# Patient Record
Sex: MALE | Race: WHITE
[De-identification: names, ages, dates, MRNs, and addresses within clinical notes are randomized per-mention and may not be internally consistent; named-entity substitution may affect disease eponyms.]

---

## 2018-11-01 NOTE — XMS REPORT
Patient Summary Document

                             Created on: 2018



DREW NAIR

External Reference #: [id number]

: 1945

Sex: Male



Demographics







                          Address                   [address] [place] [address]

 

                          Home Phone                [phone]

 

                          Preferred Language        Unknown

 

                          Marital Status            Unknown

 

                          [employer] Affiliation     Unknown

 

                          Race                      Unknown

 

                                        Additional Race(s)  

 

                          Ethnic Group              Unknown





Author







                          Author                    [facility]

 

                          Address                   Unknown

 

                          Phone                     Unavailable







Care Team Providers







                    Care Team Member Name    Role                Phone

 

                    DALLIN HARRIS    Unavailable         Unavailable







Problems

This patient has no known problems.



Allergies, Adverse Reactions, Alerts

This patient has no known allergies or adverse reactions.



Medications

This patient has no known medications.



Results







           Test Description    Test Time    Test Comments    Text Results    Atomic Results    Result

 Comments

 

                CHEST SINGLE (PORTABLE)    2018 10:45:00                                                   

                                                       [facility]                        [address] [address]      Patient Name: DREW NAIR                        
          MR #: [id number]                     : 1945                  
                Age/Sex: 73/M  Acct #: [id number]                             
Req #: [id number]  Adm Physician:                                               
      Ordered by: MICAH HARRIS MD                            Report #: 
[id number]        Location: ER                                      Room/Bed:    
                
___________________________________________________________________________________________________
   Procedure: [id number] DX/CHEST SINGLE (PORTABLE)  Exam Date: 18         
                  Exam Time: 1015                                              
REPORT STATUS: Signed    EXAMINATION:  CHEST SINGLE (PORTABLE)          I
NDICATION:      Chest pain. Shortness of breath        COMPARISON:  None        
  FINDINGS:   TUBES and LINES:  None.      LUNGS:  Lungs are well inflated.  
Lungs are clear.   There is no evidence of   pneumonia or pulmonary edema.      
PLEURA:  No pleural effusion or pneumothorax.      HEART AND MEDIASTINUM:  The 
cardiomediastinal silhouette is unremarkable.          BONES AND SOFT TISSUES:  
No acute osseous lesion.  Soft tissues are   unremarkable.      UPPER ABDOMEN: 
No free air under the diaphragm.          IMPRESSION:    No acute thoracic 
abnormality.         Signed by: Dr. Glen Ward M.D. on 2018 10:45 AM     
  Dictated By: GLEN WARD MD, MD  Electronically Signed By: GLEN WARD MD, MD on 18 1045  Transcribed By: BETH on 18 1045       COPY TO:   
MICAH HARRIS MD

## 2018-11-01 NOTE — DIAGNOSTIC IMAGING REPORT
EXAMINATION:  CHEST SINGLE (PORTABLE)    



INDICATION:      Chest pain. Shortness of breath  



COMPARISON:  None

     

FINDINGS:

TUBES and LINES:  None.



LUNGS:  Lungs are well inflated.  Lungs are clear.   There is no evidence of

pneumonia or pulmonary edema.



PLEURA:  No pleural effusion or pneumothorax.



HEART AND MEDIASTINUM:  The cardiomediastinal silhouette is unremarkable.    



BONES AND SOFT TISSUES:  No acute osseous lesion.  Soft tissues are

unremarkable.



UPPER ABDOMEN: No free air under the diaphragm.    



IMPRESSION: 

No acute thoracic abnormality.





Signed by: Dr. Glen Ward M.D. on 11/1/2018 10:45 AM

## 2018-11-01 NOTE — CONSULTATION
DATE OF CONSULTATION:  November 01, 2018 



CARDIOLOGY CONSULTATION 



REQUESTING PHYSICIAN:  Dr. Delarosa



REASON FOR CONSULT:  Chest pain, shortness of breath. 



HISTORY OF PRESENT ILLNESS:  Mr. Maguire is a 72-year-old gentleman with 

past medical history as listed below.  Apparently, he has been experiencing 

chest pain off and on for the last 6 months or so.  He has also been 

getting short of breath for the last several weeks and had some leg edema, 

and he decided to come to the hospital.  He stated the chest pain was mild 

all across his chest.  He thought it was reflux.  However, since it 

continued to persist, he called the office, and he was advised to get to 

the ER.



REVIEW OF SYMPTOMS 

CONSTITUTIONAL:  Had some fatigue and weakness.  

HEENT:  No headache, blurring of vision, seizure or syncope.

CARDIOVASCULAR:  Chest pain.  Has some dyspnea.  Has leg edema.  No 

orthopnea or PND.

RESPIRATORY:  No cough, fever, or expectoration.

GI:  No abdominal pain, vomiting or diarrhea.

:  No dysuria, frequency, incontinence.



ALLERGIES:  AZITHROMYCIN. 



MEDICATIONS:  See list.



PAST MEDICAL HISTORY

1. History of hypertension.

2. History of chronic backache.

3. History of cellulitis.



PAST SURGICAL HISTORY:  Tonsillectomy.



SOCIAL HISTORY:  He does not smoke or drink.



FAMILY HISTORY:  Noncontributory.



PHYSICAL EXAMINATION 

GENERAL:  Obese gentleman who is awake, alert, not in any obvious distress. 

 

VITALS:  Heart rate 69, blood pressure 142/78, respiratory rate 19, 

temperature 97.6.

HEENT:  Atraumatic. 

NECK:  No JVD, bruit, thyromegaly, or lymphadenopathy.

CARDIOVASCULAR:  The 1st and 2nd heart sounds are heard.  No murmurs, rubs 

or gallops appreciated.

CHEST:  Decreased air entry at the bases.  No adventitious sounds 

appreciated.

ABDOMEN:  Obese, nontender.

EXTREMITIES:  1+ edema.



LABS:  WBC 12.4, hemoglobin 14.3, hematocrit 42.8, platelets 287.  Sodium 

140, potassium 4.0, chloride 108, BUN 17, creatinine 0.5.  Troponin 0.03.  

.  AST 18, ALT 34, alk phos 85.  



EKG shows sinus rhythm at 67 beats per minute, normal axis, normal 

intervals, LVH, ST depression V5 and V6.



Chest x-ray:  No acute thoracic abnormality.



IMPRESSION 

1. Congestive heart failure.

2. Chest pain.

3. Hypertension.

4. Obesity. 

5. Chronic backache.



PLAN

1. IV diuresis.

2. Follow serial cardiac enzymes.

3. Get echocardiogram to assess LV function and valvular function. 

4. Treat him with aspirin, beta blocker, and ACE inhibitor.

5. Further cardiac workup depending on clinical course.

6. Counseled on diet, fluid restriction and low salt.

7. I discussed my impression and plan of management with the patient, 

and he understands.



As always, I appreciate and thank you very much for your referrals.



 



DD:  11/01/2018 12:33

DT:  11/01/2018 13:06

Job#:  [id number]

## 2018-11-02 NOTE — HISTORY AND PHYSICAL
PRIMARY CARE PROVIDER:  Dr. Pro Manuel.



CONSULTANT:  Nelson Barton MD



CHIEF COMPLAINT:  Chest pain and shortness of breath.



HISTORY:  This 73-year-old male came to the hospital with chest pain and 

shortness of breath.  Patient was seen by Dr. Barton.  Patient is doing 

much better now.  He is stable.  Cardiac enzymes have been negative.  

Echocardiogram showed that the patient's ejection fraction is approximately 

55% to 60% with trace AI and MR.  The patient has some fluid overload.  He 

does have chronic venous skin changes of the lower extremities.  Patient is 

obese.



PAST MEDICAL HISTORY:  Hypertension.  Chronic lower back pain.



PAST SURGICAL HISTORY:  Tonsillectomy.



HOME MEDICATIONS:  He is taking Norvasc, hydralazine and metoprolol.



ALLERGIES:  AZITHROMYCIN.



SOCIAL HISTORY:  Patient does not smoke or use alcohol.  No recreational 

drugs.



REVIEW OF SYSTEMS:  Atypical chest pain, nonradiating.  No nausea or 

vomiting.  Some shortness breath especially with exertion. 



PHYSICAL EXAMINATION:  

GENERAL:  The patient is not in acute distress.  He is awake.

VITAL SIGNS:  Temperature is 98, blood pressure 183/78.  Pulse rate 77.  

Respirations 18. 

HEENT:  Normocephalic and atraumatic.  Sclerae are anicteric. 

NECK:  Supple grossly.  No JVD.

PULMONARY:  Diminished breath sounds at bases with no wheezing or rales.  

CARDIOVASCULAR:  S1 and S2.  Regular rate and rhythm. 

ABDOMEN:  Soft, obese.  Nontender.  Nondistended. 

EXTREMITIES:  1+ edema with chronic venous skin changes.

NEUROLOGIC:  No gross focal deficit. 



LABORATORY:  Sodium is 140, potassium 3.7, chloride 106, bicarb 24, BUN 13, 

creatinine 0.8.  Glucose 125.



WBC 11.5, hemoglobin 13.8.  Hematocrit 41.5.  Platelets 260.



Echocardiogram:  Ejection fraction of 55% to 60%.



Chest x-ray is unremarkable.



IMPRESSION  

1. Atypical chest pain.

2. Fluid overload, possible secondary to obesity versus diastolic 

dysfunction heart failure.  



PLAN:  Continue with diuresis.  Resume home medication.  Patient was seen 

by Dr. Barton.  The patient would like to go home and follow up next week 

for cardiac stress test.  He does not want to wait until Monday to receive 

his stress test.  The patient is otherwise stable.  He is stable to go home 

and follow up with Dr. Barton.



    



DD:  11/02/2018 09:23

DT:  11/02/2018 09:46

Job#:  [id number]

## 2018-11-02 NOTE — DISCHARGE SUMMARY
PRIMARY CARE PHYSICIAN:  Dr. Pro Manuel.



CONSULTANT:  Dr. Nelson Barton. 



FINAL DIAGNOSES

1. Fluid overload, possible acute diastolic dysfunction heart failure 

with ejection fraction of 55% to 60%.

2. Lower extremity edema, improving with intravenous Lasix.

3. Obesity.

4. Chest pain, atypical.



PLAN:  Patient will follow up with Dr. Barton next week for stress test.  

The patient is otherwise stable.  Please review my history and physical.  

Patient is to follow up.  He will resume his home medication.  I will add 

on Lasix 40 mg once a day along with potassium 20 mEq daily.  The patient 

is to follow up and have his blood rechecked for renal function with his 

primary care physician, Dr. Pro Manuel.

 





DD:  11/02/2018 09:24

DT:  11/02/2018 15:24

Job#:  [id number]

## 2019-01-03 NOTE — CARDIOLOGY REPORT
DATE OF STUDY:  January 02, 2019 



LEXISCAN NUCLEAR STRESS TEST



INDICATIONS:  Chest pain.



DESCRIPTION OF PROCEDURE:  After informed consent, patient was brought to 

the stress lab.  He was given 10.7 mCi of technetium 99 Myoview, and 

myocardial perfusion SPECT images were obtained in the horizontal long 

axis, short axis and vertical long axis views.  Subsequently patient was 

given 0.4 mg Lexiscan over 10 seconds.  Patient was given 33 mCi of 

technetium 99 Myoview, and myocardial perfusion SPECT images were obtained 

in the horizontal long axis, short axis and vertical long axis views.  

Gaiting images were also obtained.  Patient tolerated this procedure 

without any complications.  



REPORT:  Baseline EKG shows sinus rhythm at 75 beats per minute.  Normal 

axis.  Normal intervals.  ST depression in the inferolateral leads.



PARAMETERS

1. Resting heart rate is 75 beats per minute.

2. Maximal heart rate is 90 beats per minute.

3. Resting blood pressure is 149/67 mmHg.

4. Maximum blood pressure 156/71 mmHg.



REASON FOR TERMINATION:  Endpoint attained.



INTERPRETATION

1. Negative for chest pain.

2. Negative for arrhythmias.  

3. Blood pressure response consistent with Lexiscan.

4. No significant ST-T changes seen during Lexiscan infusion compared to 

baseline.

5. Analysis of SPECT images reveals a small to moderate area of 

decreased radioisotope uptake in the anterior apical wall during stress, 

which partially reverses during rest.  There is a moderate area of 

decreased radioisotope uptake in the inferior wall both during stress 

and rest without any significant reversible perfusion defects.



CONCLUSIONS

1. This study demonstrates a small to moderate area of anterior apical 

ischemia with a small area of apical infarct.  

2. There is a moderate area of inferior wall infarct.  

3. The inferior apical wall is hypokinetic.  

4. The overall ejection fraction is 35%. 











DD:  01/03/2019 12:32

DT:  01/03/2019 13:42

Job#:  [id number]

## 2019-02-05 LAB
ALBUMIN SERPL-MCNC: 3.5 G/DL (ref 3.5–5)
ALBUMIN/GLOB SERPL: 1.2 {RATIO} (ref 0.8–2)
ALP SERPL-CCNC: 85 IU/L (ref 40–150)
ALT SERPL-CCNC: 28 IU/L (ref 0–55)
ANION GAP SERPL CALC-SCNC: 15.5 MMOL/L (ref 8–16)
BASOPHILS # BLD AUTO: 0.1 10*3/UL (ref 0–0.1)
BASOPHILS NFR BLD AUTO: 0.7 % (ref 0–1)
BUN SERPL-MCNC: 24 MG/DL (ref 7–26)
BUN/CREAT SERPL: 23 (ref 6–25)
CALCIUM SERPL-MCNC: 9.2 MG/DL (ref 8.4–10.2)
CHLORIDE SERPL-SCNC: 100 MMOL/L (ref 98–107)
CO2 SERPL-SCNC: 27 MMOL/L (ref 22–29)
DEPRECATED APTT PLAS QN: 33.1 SECONDS (ref 23.8–35.5)
DEPRECATED INR PLAS: 0.98
DEPRECATED NEUTROPHILS # BLD AUTO: 8.5 10*3/UL (ref 2.1–6.9)
EGFRCR SERPLBLD CKD-EPI 2021: > 60 ML/MIN (ref 60–?)
EOSINOPHIL # BLD AUTO: 0.4 10*3/UL (ref 0–0.4)
EOSINOPHIL NFR BLD AUTO: 3.9 % (ref 0–6)
ERYTHROCYTE [DISTWIDTH] IN CORD BLOOD: 14.8 % (ref 11.7–14.4)
GLOBULIN PLAS-MCNC: 2.9 G/DL (ref 2.3–3.5)
GLUCOSE SERPLBLD-MCNC: 106 MG/DL (ref 74–118)
HCT VFR BLD AUTO: 39.8 % (ref 38.2–49.6)
HGB BLD-MCNC: 12.5 G/DL (ref 14–18)
LYMPHOCYTES # BLD: 1.6 10*3/UL (ref 1–3.2)
LYMPHOCYTES NFR BLD AUTO: 13.9 % (ref 18–39.1)
MCH RBC QN AUTO: 26.9 PG (ref 28–32)
MCHC RBC AUTO-ENTMCNC: 31.4 G/DL (ref 31–35)
MCV RBC AUTO: 85.6 FL (ref 81–99)
MONOCYTES # BLD AUTO: 0.8 10*3/UL (ref 0.2–0.8)
MONOCYTES NFR BLD AUTO: 6.7 % (ref 4.4–11.3)
NEUTS SEG NFR BLD AUTO: 74.4 % (ref 38.7–80)
PLATELET # BLD AUTO: 273 X10E3/UL (ref 140–360)
POTASSIUM SERPL-SCNC: 4.5 MMOL/L (ref 3.5–5.1)
PROTHROMBIN TIME: 13.9 SECONDS (ref 11.9–14.5)
RBC # BLD AUTO: 4.65 X10E6/UL (ref 4.3–5.7)
SODIUM SERPL-SCNC: 138 MMOL/L (ref 136–145)

## 2019-02-05 NOTE — DIAGNOSTIC IMAGING REPORT
EXAM: CHEST 2 VIEWS, PA and lateral

DATE: 2/5/2019Time stamp on exam: 10:27 AM

INDICATION: Preoperative for cardiac catheterization

COMPARISON: None



FINDINGS:

LINES/TUBES: None



LUNGS: No consolidations or edema. 



PLEURA: No effusions or pneumothorax.



HEART AND MEDIASTINUM: Tortuous and calcified thoracic aorta. The heart is not

enlarged. No mediastinal adenopathy.



BONES AND SOFT TISSUES: No acute findings. Degenerative changes of the spine.



IMPRESSION:

No acute thoracic abnormality.











Signed by: Dr. Messi Davis DO on 2/5/2019 11:07 AM

## 2019-02-06 ENCOUNTER — HOSPITAL ENCOUNTER (OUTPATIENT)
Dept: HOSPITAL 88 - CATH LAB | Age: 74
Setting detail: OBSERVATION
LOS: 1 days | Discharge: HOME | End: 2019-02-07
Payer: MEDICARE

## 2019-02-06 VITALS — DIASTOLIC BLOOD PRESSURE: 66 MMHG | SYSTOLIC BLOOD PRESSURE: 110 MMHG

## 2019-02-06 VITALS — DIASTOLIC BLOOD PRESSURE: 72 MMHG | SYSTOLIC BLOOD PRESSURE: 142 MMHG

## 2019-02-06 VITALS — DIASTOLIC BLOOD PRESSURE: 73 MMHG | SYSTOLIC BLOOD PRESSURE: 141 MMHG

## 2019-02-06 VITALS — SYSTOLIC BLOOD PRESSURE: 120 MMHG | DIASTOLIC BLOOD PRESSURE: 74 MMHG

## 2019-02-06 VITALS — SYSTOLIC BLOOD PRESSURE: 116 MMHG | DIASTOLIC BLOOD PRESSURE: 98 MMHG

## 2019-02-06 VITALS — DIASTOLIC BLOOD PRESSURE: 75 MMHG | SYSTOLIC BLOOD PRESSURE: 141 MMHG

## 2019-02-06 VITALS — DIASTOLIC BLOOD PRESSURE: 65 MMHG | SYSTOLIC BLOOD PRESSURE: 130 MMHG

## 2019-02-06 VITALS — SYSTOLIC BLOOD PRESSURE: 126 MMHG | DIASTOLIC BLOOD PRESSURE: 75 MMHG

## 2019-02-06 VITALS — SYSTOLIC BLOOD PRESSURE: 138 MMHG | DIASTOLIC BLOOD PRESSURE: 123 MMHG

## 2019-02-06 VITALS — DIASTOLIC BLOOD PRESSURE: 56 MMHG | SYSTOLIC BLOOD PRESSURE: 112 MMHG

## 2019-02-06 VITALS — SYSTOLIC BLOOD PRESSURE: 145 MMHG | DIASTOLIC BLOOD PRESSURE: 77 MMHG

## 2019-02-06 VITALS — DIASTOLIC BLOOD PRESSURE: 102 MMHG | SYSTOLIC BLOOD PRESSURE: 134 MMHG

## 2019-02-06 VITALS — DIASTOLIC BLOOD PRESSURE: 79 MMHG | SYSTOLIC BLOOD PRESSURE: 145 MMHG

## 2019-02-06 VITALS — DIASTOLIC BLOOD PRESSURE: 91 MMHG | SYSTOLIC BLOOD PRESSURE: 132 MMHG

## 2019-02-06 VITALS — SYSTOLIC BLOOD PRESSURE: 98 MMHG | DIASTOLIC BLOOD PRESSURE: 66 MMHG

## 2019-02-06 VITALS — SYSTOLIC BLOOD PRESSURE: 136 MMHG | DIASTOLIC BLOOD PRESSURE: 65 MMHG

## 2019-02-06 VITALS — SYSTOLIC BLOOD PRESSURE: 104 MMHG | DIASTOLIC BLOOD PRESSURE: 80 MMHG

## 2019-02-06 VITALS — SYSTOLIC BLOOD PRESSURE: 134 MMHG | DIASTOLIC BLOOD PRESSURE: 102 MMHG

## 2019-02-06 VITALS — DIASTOLIC BLOOD PRESSURE: 76 MMHG | SYSTOLIC BLOOD PRESSURE: 144 MMHG

## 2019-02-06 VITALS — SYSTOLIC BLOOD PRESSURE: 109 MMHG | DIASTOLIC BLOOD PRESSURE: 65 MMHG

## 2019-02-06 VITALS — DIASTOLIC BLOOD PRESSURE: 67 MMHG | SYSTOLIC BLOOD PRESSURE: 116 MMHG

## 2019-02-06 VITALS — WEIGHT: 315 LBS | BODY MASS INDEX: 41.75 KG/M2 | HEIGHT: 73 IN

## 2019-02-06 VITALS — DIASTOLIC BLOOD PRESSURE: 68 MMHG | SYSTOLIC BLOOD PRESSURE: 145 MMHG

## 2019-02-06 VITALS — SYSTOLIC BLOOD PRESSURE: 148 MMHG | DIASTOLIC BLOOD PRESSURE: 87 MMHG

## 2019-02-06 VITALS — SYSTOLIC BLOOD PRESSURE: 141 MMHG | DIASTOLIC BLOOD PRESSURE: 76 MMHG

## 2019-02-06 VITALS — DIASTOLIC BLOOD PRESSURE: 119 MMHG | SYSTOLIC BLOOD PRESSURE: 142 MMHG

## 2019-02-06 VITALS — SYSTOLIC BLOOD PRESSURE: 130 MMHG | DIASTOLIC BLOOD PRESSURE: 102 MMHG

## 2019-02-06 DIAGNOSIS — R94.39: ICD-10-CM

## 2019-02-06 DIAGNOSIS — I11.0: ICD-10-CM

## 2019-02-06 DIAGNOSIS — I35.1: ICD-10-CM

## 2019-02-06 DIAGNOSIS — K21.9: ICD-10-CM

## 2019-02-06 DIAGNOSIS — R07.2: ICD-10-CM

## 2019-02-06 DIAGNOSIS — Z88.1: ICD-10-CM

## 2019-02-06 DIAGNOSIS — E66.9: ICD-10-CM

## 2019-02-06 DIAGNOSIS — Z01.810: ICD-10-CM

## 2019-02-06 DIAGNOSIS — I07.1: ICD-10-CM

## 2019-02-06 DIAGNOSIS — I34.0: ICD-10-CM

## 2019-02-06 DIAGNOSIS — Z01.811: ICD-10-CM

## 2019-02-06 DIAGNOSIS — G47.33: ICD-10-CM

## 2019-02-06 DIAGNOSIS — Z82.49: ICD-10-CM

## 2019-02-06 DIAGNOSIS — I50.32: ICD-10-CM

## 2019-02-06 DIAGNOSIS — I35.0: ICD-10-CM

## 2019-02-06 DIAGNOSIS — I25.10: Primary | ICD-10-CM

## 2019-02-06 DIAGNOSIS — Z01.812: ICD-10-CM

## 2019-02-06 LAB
CHOLEST SERPL-MCNC: 182 MD/DL (ref 0–199)
CHOLEST/HDLC SERPL: 3.8 {RATIO} (ref 3.9–4.7)
HDLC SERPL-MSCNC: 48 MG/DL (ref 40–60)
LDLC SERPL CALC-MCNC: 112 MG/DL (ref 60–130)
TRIGL SERPL-MCNC: 111 MG/DL (ref 0–149)

## 2019-02-06 PROCEDURE — 92928 PRQ TCAT PLMT NTRAC ST 1 LES: CPT

## 2019-02-06 PROCEDURE — 85347 COAGULATION TIME ACTIVATED: CPT

## 2019-02-06 PROCEDURE — 36415 COLL VENOUS BLD VENIPUNCTURE: CPT

## 2019-02-06 PROCEDURE — 93005 ELECTROCARDIOGRAM TRACING: CPT

## 2019-02-06 PROCEDURE — 85610 PROTHROMBIN TIME: CPT

## 2019-02-06 PROCEDURE — 71046 X-RAY EXAM CHEST 2 VIEWS: CPT

## 2019-02-06 PROCEDURE — 85730 THROMBOPLASTIN TIME PARTIAL: CPT

## 2019-02-06 PROCEDURE — 93306 TTE W/DOPPLER COMPLETE: CPT

## 2019-02-06 PROCEDURE — 93458 L HRT ARTERY/VENTRICLE ANGIO: CPT

## 2019-02-06 PROCEDURE — 80061 LIPID PANEL: CPT

## 2019-02-06 PROCEDURE — 85025 COMPLETE CBC W/AUTO DIFF WBC: CPT

## 2019-02-06 PROCEDURE — 80048 BASIC METABOLIC PNL TOTAL CA: CPT

## 2019-02-06 PROCEDURE — 80053 COMPREHEN METABOLIC PANEL: CPT

## 2019-02-06 NOTE — XMS REPORT
Clinical Summary

                             Created on: 2019



FredyfatoumataLuke

External Reference #: [id number]

: 1945

Sex: Male



Demographics







                          Address                   [address] [place]  [address]

 

                          Home Phone                [phone]

 

                          Preferred Language        English

 

                          Marital Status            Life Partner

 

                          Quaker Affiliation     [id number]

 

                          Race                      White

 

                          Ethnic Group              Non-





Author







                          Author                    [facility]

 

                          Organization              [facility]

 

                          Address                   Unknown

 

                          Phone                     Unavailable







Support







                Name            Relationship    Address         Phone

 

                Bhupendra Britton    KOMAL            Unknown         [phone]







Care Team Providers







                    Care Team Member Name    Role                Phone

 

                    Pro Manuel MD    PCP                 [phone]







Allergies







                                        Comments



                 Active Allergy     Reactions       Severity        Noted Date 

 

                                        



Pt reacted with hives all over his body after receiving z-pack for a cold



                     Azithromycin        Hives               2018 







Medications







                          End Date                  Status



              Medication     Sig          Dispensed     Refills      Start  



                                         Date  

 

                                                    Active



                 amlodipine-benazepril     Take 1          1                 



                     (LOTREL) 10-40 mg per     capsule by          8  



                           capsule                   mouth every     



                                         morning.     

 

                                                    Active



                 hydrALAZINE (APRESOLINE)     Take 1 tablet      1                 



                     25 MG tablet        by mouth 2          8  



                                         (two) times a     



                                         day.     

 

                                                    Active



                 metoprolol tartrate     Take 1 tablet      1               10/31/201  



                     (LOPRESSOR) 50 mg tablet     by mouth 2          8  



                                         (two) times a     



                                         day.     

 

                                                    Active



                 omeprazole (PriLOSEC) 20     Take 1          0                 



                     MG capsule          capsule by          8  



                                         mouth every     



                                         morning.     

 

                                                    Active



                 furosemide (LASIX) 40 mg     Take 1 tablet      1               10/31/201  



                     tablet              by mouth            8  



                                         every     



                                         morning.     

 

                                                    Active



                 potassium chloride     Take 1 tablet      0                 



                     (K-DUR) 20 MEQ CR tablet     by mouth            8  



                                         every     



                                         morning.     

 

                                                    Active



                 tiZANidine (ZANAFLEX) 4     Take 1 tablet      0                 



                     MG tablet           by mouth            8  



                                         nightly as     



                                         needed.     

 

                                                    Active



                     aspirin (ECOTRIN) 325 MG     Take 325 mg         0   



                           enteric coated tablet     by mouth     



                                         every     



                                         morning.     

 

                          2019                



              levoFLOXacin (LEVAQUIN)     Take 1 tablet     5 tablet     0              



                     250 MG tablet       (250 mg             8  



                                         total) by     



                                         mouth daily     



                                         for 5 days.     







Active Problems







 



                           Problem                   Noted Date

 

 



                           Chest pain                2018

 

 



                           SOB (shortness of breath)     2018







Encounters







                          Care Team                 Description



                     Date                Type                Specialty  

 

                                        



Deshawn Álvarez DO                     SOB (shortness of breath) (Primary Dx)



                     2018          [facility]            General Internal Medicine  



                           -                         Encounter   



                                         2018    



after 2018



Family History







   



                 Medical History     Relation        Name            Comments

 

   



                     Cancer              Father              Lung cancer 









   



                 Relation        Name            Status          Comments

 

   



                     Father              Lung cancer          







Social History







                                        Date



                 Tobacco Use     Types           Packs/Day       Years Used 

 

                                         



                                         Never Smoker    

 

    



                                         Smokeless Tobacco: Never   



                                         Used   









   



                 Alcohol Use     Drinks/Week     oz/Week         Comments

 

   



                                         No   









  



                     Alcohol Habits      Answer              Date Recorded

 

  



                     How often do you have a drink containing alcohol?     Never               2018

 

  



                           How many drinks containing alcohol do you have on     Not asked 



                                         a typical day when you are drinking?  

 

  



                           How often do you have six or more drinks on one     Not asked 



                                         occasion?  









 



                           Sex Assigned at Birth     Date Recorded

 

 



                                         Not on file 









                                        Industry



                           Job Start Date            Occupation 

 

                                        Not on file



                           Not on file               Not on file 









                                        Travel End



                           Travel History            Travel Start 

 





                                         No recent travel history available.







Last Filed Vital Signs







                                        Time Taken



                           Vital Sign                Reading 

 

                                        2018 10:28 AM CST



                           Blood Pressure            162/72 

 

                                        2018 10:28 AM CST



                           Pulse                     71 

 

                                        2018 10:28 AM CST



                           Temperature               36.6 C (97.9 F) 

 

                                        2018 10:28 AM CST



                           Respiratory Rate          18 

 

                                        2018 10:28 AM CST



                           Oxygen Saturation         96% 

 

                                        -



                           Inhaled Oxygen            - 



                                         Concentration  

 

                                        -



                           Weight                    - 

 

                                        2018  5:00 PM CST



                           Height                    185.4 cm (6' 1") 

 

                                        -



                           Body Mass Index           - 







Plan of Treatment







   



                 Health Maintenance     Due Date        Last Done       Comments

 

   



                           COLON CANCER SCREENING     1995  

 

   



                           SHINGLES VACCINES (1 of     1995  



                                         2)   

 

   



                           PNEUMOCOCCAL              2010  



                                         POLYSACCHARIDE VACCINE   



                                         AGE 65 AND OVER   

 

   



                           PNEUMOCOCCAL-13           2010  

 

   



                           INFLUENZA VACCINE         2018  







Procedures







                                        Comments



                 Procedure Name     Priority        Date/Time       Associated Diagnosis 

 

                                        



Results for this procedure are in the results section.



                     ESTIMATED GFR       Routine             2018  



                                         4:22 AM CST  

 

                                        



Results for this procedure are in the results section.



                     BASIC METABOLIC PANEL     Routine             2018  



                                         4:22 AM CST  

 

                                        



Results for this procedure are in the results section.



                     HC COMPLETE BLD COUNT     Routine             2018  



                           W/AUTO DIFF               4:22 AM CST  

 

                                        



Results for this procedure are in the results section.



                     TROPONIN            Timed               2018  



                                         12:28 AM CST  

 

                                        



Results for this procedure are in the results section.



                     TROPONIN            Timed               2018  



                                         5:52 PM CST  

 

                                        



Results for this procedure are in the results section.



                     CT ANGIOGRAM PE CHEST     Routine             2018  



                                         5:30 PM CST  



after 2018



Results

* Estimated GFR (2018  4:22 AM CST)





   



                 Component       Value           Ref Range       Performed At

 

   



                 Estimated GFR     66              mL/min/1.73 m2     [facility]



                           Comment:                  [facility]



                                         CatergoryUnitsInte  



                                         rpretation  



                                         G1  



                                         >=90 Normal or high  



                                         G2  



                                         60-89Mildly decreased  



                                         H1i99-07  



                                         Mildly to moderately  



                                         decreased  



                                         H8j31-24  



                                         Moderately to severely  



                                         decreased  



                                         G4  



                                         15-29Severely  



                                         decreased  



                                         G5  



                                         <15Kidney failure  



                                         The eGFR was calculated using  



                                         the Chronic Kidney Disease  



                                         Epidemiology Collaboration  



                                         (CKD-EPI) equation.  



                                         Interpretation is based on  



                                         recommendations of the  



                                         National Kidney  



                                         Foundation-Kidney Disease  



                                         Outcomes Quality  



                                         Initiative (NKF-KDOQI)  



                                         published in 2014.  













                                         Specimen

 





                                         Plasma specimen









   



                 Performing Organization     Address         City/State/Zipcode     Phone Number

 

   



                     [facility]     [address] [place] [address] 



                                         PATHOLOGY AND GENOMIC   



                                         MEDICINE   

 

   



                     [facility]     [address]      [place], [place] [address] [place] [address]   





* CBC with platelet and differential (2018  4:22 AM CST)





   



                 Component       Value           Ref Range       Performed At

 

   



                 WBC             10.78           4.50 - 11.00 k/uL     [facility]

 

   



                 RBC             4.79            4.40 - 6.00 m/uL     [facility]

 

   



                 HGB             13.4 (L)        14.0 - 18.0 g/dL     [facility]

 

   



                 HCT             42.0            41.0 - 51.0 %     [facility]

 

   



                 MCV             87.7            82.0 - 100.0 fL     [facility]

 

   



                 MCH             28.0            27.0 - 34.0 pg     [facility]

 

   



                 MCHC            31.9            31.0 - 37.0 g/dL     [facility]

 

   



                 RDW - SD        47.2            37.0 - 55.0 fL     [facility]

 

   



                 MPV             9.2             8.8 - 13.2 fL     [facility]

 

   



                 Platelet count     300             150 - 400 k/uL     [facility]

 

   



                 Nucleated RBC     0.00            /100 WBC        [facility]

 

   



                 Neutrophils     75.0 (H)        39.0 - 69.0 %     [facility]

 

   



                 Lymphocytes     15.0 (L)        25.0 - 45.0 %     [facility]

 

   



                 Monocytes       6.8             0.0 - 10.0 %     [facility]

 

   



                 Eosinophils     2.3             0.0 - 5.0 %     [facility]

 

   



                 Basophils       0.6             0.0 - 1.0 %     [facility]













                                         Specimen

 





                                         Blood









   



                 Performing Organization     Address         [facility]     Phone Number

 

   



                     [address] [place] [address] 



                                         PATHOLOGY AND GENOMIC   



                                         MEDICINE   

 

   



                     [address]      [address]   





* Basic metabolic panel (2018  4:22 AM CST)





   



                 Component       Value           Ref Range       Performed At

 

   



                 Sodium          142             135 - 148 mEq/L     [facility]

 

   



                 Potassium       3.8             3.5 - 5.0 mEq/L     [facility]

 

   



                 Chloride        103             98 - 112 mEq/L     [facility]

 

   



                 CO2             28              24 - 31 mEq/L     [facility]

 

   



                 Anion gap       11@ANIO         7 - 15 mEq/L     [facility]

 

   



                 BUN             20              8 - 23 mg/dL     [facility]

 

   



                 Creatinine      1.10            0.70 - 1.20 mg/dL     [facility]

 

   



                 Glucose         126 (H)         65 - 99 mg/dL     [facility]

 

   



                 Calcium         9.2             8.8 - 10.2 mg/dL     [facility]













                                         Specimen

 





                                         Plasma specimen









   



                 Performing Organization     Address         [place]     Phone Number

 

   



                     [address] [place] [address] 



                                         PATHOLOGY AND GENOMIC   



                                         MEDICINE   

 

   



                     [address]      [address]   





* Troponin (2018 12:28 AM CST)



Only the most recent of 2 results within the time period is included.





   



                 Component       Value           Ref Range       Performed At

 

   



                 Troponin        <0.300          0.000 - 0.300 ng/mL     [facility]



                           Comment:                  [facility]



                                         0.30 - 1.49  



                                         ng/mlMay  



                                         indicate increased risk of  



                                         acute  



                                           



                                          coronary  



                                         syndrome.  



                                           



                                           



                                         >=1.5  



                                         ng/ml  



                                         Consistent with acute  



                                         myocardial  



                                           



                                          infarction.  



                                           



                                           



                                           



                                           



                                           



                                         The diagnostic value of a  



                                         single normal or  



                                         non-diagnostic  



                                         result is  



                                         questionable.Serial  



                                         samples at 2-6 hour intervals  



                                         are required to rule out acute  



                                         myocardial injury.  













                                         Specimen

 





                                         Plasma specimen









   



                 Performing Organization     Address         [facility]     Phone Number

 

   



                     [address] [place] [address] 



                                         PATHOLOGY AND GENOMIC   



                                         MEDICINE   

 

   



                     [facility]     [address] [place] [address] 



                                         [facility]   





* CT Angiogram Pe Chest (2018  5:30 PM CST)





 



                           Narrative                 Performed At

 

 



                           EXAMINATION:               [facility]



                                         CT ANGIOGRAM PE CHEST 



                                         CLINICAL HISTORY:73 years Male PE r o 



                                         TECHNIQUE:CT angiographic images of the chest were obtained during 



                                         intravenous administration of iodinated contrast. Computerized reformatted 



                                         images and 3-D MIP images were also obtained and archived (CT pulmonary embolus

 



                                         protocol). CT imaging was 



                                         performed with iterative reconstruction techniques and/or automated exposure 



                                         control to reduce radiation dose. 



                                         COMPARISON: 



                                         None. 



                                         FINDINGS: 



                                         CHEST: 



                                         Pulmonary arteries: Normal in size. No intraluminal filling defects. 



                                         Lungs and airways: Minimal groundglass opacities are present in the lung bases 





                                         likely related to hypoventilation. There is minimal bibasilar atelectasis. There

 



                                         are no focal consolidations or suspicious pulmonary nodules. 



                                         Pleura: Tiny dependent bilateral pleural effusions are present. No pneumothorax.

 



                                         Mediastinum and lymph nodes: No hilar or mediastinal mass or adenopathy.There 



                                         are small paratracheal, prevascular, and subcarinal lymph nodes. 



                                         Cardiovascular: The heart size is normal. Minimal coronary calcifications are 



                                         present. There are mild aortic and mitral valvular calcifications. There is no 





                                         pericardial effusion. 



                                         Upper abdomen: There are small bilateral adrenal adenomas. The upper abdomen is

 



                                         otherwise unremarkable. 



                                         Bones: The thoracic aorta is minimally atherosclerotic without evidence of 



                                         aneurysm or dissection. Moderate hypertrophic changes are present in the 



                                         thoracic spine. There are no suspicious bony abnormalities. 



                                         Other: There is moderate gynecomastia, greater on the left. 



                                         IMPRESSION: 



                                         1.No CT evidence of acute pulmonary thromboembolic disease. 



                                         2.Minimal bibasilar atelectasis with tiny bilateral dependent pleural 



                                         effusions. 



                                         3.Minimal CAD. Mild aortic and mitral valvular disease. 



                                         4.Small benign bilateral adrenal adenomas. 



                                         5.Gynecomastia, greater on the left. 



                                         [id number] 









                                        Procedure Note

 

                                        



 Interface, Radiology Results Incoming - 2018  5:42 PM CST



EXAMINATION:

CT ANGIOGRAM PE CHEST



CLINICAL HISTORY:73 years Male PE r o



TECHNIQUE:  CT angiographic images of the chest were obtained during intravenous
administration of iodinated contrast. Computerized reformatted images and 3-D 
MIP images were also obtained and archived (CT pulmonary embolus protocol). CT 
imaging was 

performed with iterative reconstruction techniques and/or automated exposure 
control to reduce radiation dose. 



COMPARISON:

None.



FINDINGS:



CHEST:



Pulmonary arteries: Normal in size. No intraluminal filling defects.



Lungs and airways: Minimal groundglass opacities are present in the lung bases 
likely related to hypoventilation. There is minimal bibasilar atelectasis. There
are no focal consolidations or suspicious pulmonary nodules. 



Pleura: Tiny dependent bilateral pleural effusions are present. No pneumothorax.



Mediastinum and lymph nodes: No hilar or mediastinal mass or adenopathy.There 
are small paratracheal, prevascular, and subcarinal lymph nodes.   



Cardiovascular: The heart size is normal. Minimal coronary calcifications are 
present. There are mild aortic and mitral valvular calcifications. There is no 
pericardial effusion.



Upper abdomen: There are small bilateral adrenal adenomas. The upper abdomen is 
otherwise unremarkable.



Bones: The thoracic aorta is minimally atherosclerotic without evidence of 
aneurysm or dissection. Moderate hypertrophic changes are present in the 
thoracic spine. There are no suspicious bony abnormalities. 



Other: There is moderate gynecomastia, greater on the left.



IMPRESSION:

                                        1.  No CT evidence of acute pulmonary thromboembolic disease.

                                        2.  Minimal bibasilar atelectasis with tiny bilateral dependent pleural effusions.



                                        3.  Minimal CAD. Mild aortic and mitral valvular disease.

                                        4.  Small benign bilateral adrenal adenomas.

                                        5.  Gynecomastia, greater on the left.











[id number]











   



                 Performing Organization     Address         City/State/Zipcode     Phone Number

 

   



                     [facility]          [address] [place] [address] 





after 2018



Insurance







     



            Payer      Benefit     Subscriber ID     Type       Phone      Address



                                         Plan /    



                                         Group    

 

     



                 [employer]       [employer]       xxxxxxxxx       [facility]    









     



            Guarantor Name     Account     Relation to     Date of     Phone      Billing Address



                     Type                Patient             Birth  

 

     



            Luke Maguire     Personal/F     Self       1945     [phone]     [address]



                     amily               (Home)              [place] [address]







Advance Directives





Patient has advance care planning documents on file. For more information, john ham contact:



Te Johnson



[address] [place] [address]

## 2019-02-06 NOTE — NUR
Received patient from Cath -Lab patient arrived in stretcher awake alert and oriented x3, 
transferred to bed with 4 person assist, used bed board. Patient right groin with dressing 
in place c/d/i inguinal site soft to palpation, instructed patient to remain flat until 1825 
tonight. Pulses to right lower extremity assessed with Doppler pulse present.  Oriented to 
room and use of call light, placed on telemetry, verbalized understanding to call when 
needing assistance.

## 2019-02-06 NOTE — XMS REPORT
Clinical Summary

                             Created on: 2019



FredyfatoumataLuke

External Reference #: [id number]

: 1945

Sex: Male



Demographics







                          Address                   [address] [place]  [address]

 

                          Home Phone                [phone]

 

                          Preferred Language        English

 

                          Marital Status            Life Partner

 

                          Adventism Affiliation     [id number]

 

                          Race                      White

 

                          Ethnic Group              Non-





Author







                          Author                    [employer]

 

                          Organization              [facility]

 

                          Address                   Unknown

 

                          Phone                     Unavailable







Support







                Name            Relationship    Address         Phone

 

                Bhupendra Britton    KOMAL            Unknown         [phone]







Care Team Providers







                    Care Team Member Name    Role                Phone

 

                    Pro Manuel MD    PCP                 [phone]







Allergies







                                        Comments



                 Active Allergy     Reactions       Severity        Noted Date 

 

                                        



Pt reacted with hives all over his body after receiving z-pack for a cold



                     Azithromycin        Hives               2018 







Medications







                          End Date                  Status



              Medication     Sig          Dispensed     Refills      Start  



                                         Date  

 

                                                    Active



                 amlodipine-benazepril     Take 1          1                 



                     (LOTREL) 10-40 mg per     capsule by          8  



                           capsule                   mouth every     



                                         morning.     

 

                                                    Active



                 hydrALAZINE (APRESOLINE)     Take 1 tablet      1                 



                     25 MG tablet        by mouth 2          8  



                                         (two) times a     



                                         day.     

 

                                                    Active



                 metoprolol tartrate     Take 1 tablet      1               10/31/201  



                     (LOPRESSOR) 50 mg tablet     by mouth 2          8  



                                         (two) times a     



                                         day.     

 

                                                    Active



                 omeprazole (PriLOSEC) 20     Take 1          0                 



                     MG capsule          capsule by          8  



                                         mouth every     



                                         morning.     

 

                                                    Active



                 furosemide (LASIX) 40 mg     Take 1 tablet      1               10/31/201  



                     tablet              by mouth            8  



                                         every     



                                         morning.     

 

                                                    Active



                 potassium chloride     Take 1 tablet      0                 



                     (K-DUR) 20 MEQ CR tablet     by mouth            8  



                                         every     



                                         morning.     

 

                                                    Active



                 tiZANidine (ZANAFLEX) 4     Take 1 tablet      0                 



                     MG tablet           by mouth            8  



                                         nightly as     



                                         needed.     

 

                                                    Active



                     aspirin (ECOTRIN) 325 MG     Take 325 mg         0   



                           enteric coated tablet     by mouth     



                                         every     



                                         morning.     

 

                          2019                



              levoFLOXacin (LEVAQUIN)     Take 1 tablet     5 tablet     0              



                     250 MG tablet       (250 mg             8  



                                         total) by     



                                         mouth daily     



                                         for 5 days.     







Active Problems







 



                           Problem                   Noted Date

 

 



                           Chest pain                2018

 

 



                           SOB (shortness of breath)     2018







Encounters







                          Care Team                 Description



                     Date                Type                Specialty  

 

                                        



Deshawn Álvarez DO                     SOB (shortness of breath) (Primary Dx)



                     2018          [facility]            General Internal Medicine  



                           -                         Encounter   



                                         2018    



after 2018



Family History







   



                 Medical History     Relation        Name            Comments

 

   



                     Cancer              Father              Lung cancer 









   



                 Relation        Name            Status          Comments

 

   



                     Father              Lung cancer          







Social History







                                        Date



                 Tobacco Use     Types           Packs/Day       Years Used 

 

                                         



                                         Never Smoker    

 

    



                                         Smokeless Tobacco: Never   



                                         Used   









   



                 Alcohol Use     Drinks/Week     oz/Week         Comments

 

   



                                         No   









  



                     Alcohol Habits      Answer              Date Recorded

 

  



                     How often do you have a drink containing alcohol?     Never               2018

 

  



                           How many drinks containing alcohol do you have on     Not asked 



                                         a typical day when you are drinking?  

 

  



                           How often do you have six or more drinks on one     Not asked 



                                         occasion?  









 



                           Sex Assigned at Birth     Date Recorded

 

 



                                         Not on file 









                                        Industry



                           Job Start Date            Occupation 

 

                                        Not on file



                           Not on file               Not on file 









                                        Travel End



                           Travel History            Travel Start 

 





                                         No recent travel history available.







Last Filed Vital Signs







                                        Time Taken



                           Vital Sign                Reading 

 

                                        2018 10:28 AM CST



                           Blood Pressure            162/72 

 

                                        2018 10:28 AM CST



                           Pulse                     71 

 

                                        2018 10:28 AM CST



                           Temperature               36.6 C (97.9 F) 

 

                                        2018 10:28 AM CST



                           Respiratory Rate          18 

 

                                        2018 10:28 AM CST



                           Oxygen Saturation         96% 

 

                                        -



                           Inhaled Oxygen            - 



                                         Concentration  

 

                                        -



                           Weight                    - 

 

                                        2018  5:00 PM CST



                           Height                    185.4 cm (6' 1") 

 

                                        -



                           Body Mass Index           - 







Plan of Treatment







   



                 Health Maintenance     Due Date        Last Done       Comments

 

   



                           COLON CANCER SCREENING     1995  

 

   



                           SHINGLES VACCINES (1 of     1995  



                                         2)   

 

   



                           PNEUMOCOCCAL              2010  



                                         POLYSACCHARIDE VACCINE   



                                         AGE 65 AND OVER   

 

   



                           PNEUMOCOCCAL-13           2010  

 

   



                           INFLUENZA VACCINE         2018  







Procedures







                                        Comments



                 Procedure Name     Priority        Date/Time       Associated Diagnosis 

 

                                        



Results for this procedure are in the results section.



                     ESTIMATED GFR       Routine             2018  



                                         4:22 AM CST  

 

                                        



Results for this procedure are in the results section.



                     BASIC METABOLIC PANEL     Routine             2018  



                                         4:22 AM CST  

 

                                        



Results for this procedure are in the results section.



                     HC COMPLETE BLD COUNT     Routine             2018  



                           W/AUTO DIFF               4:22 AM CST  

 

                                        



Results for this procedure are in the results section.



                     TROPONIN            Timed               2018  



                                         12:28 AM CST  

 

                                        



Results for this procedure are in the results section.



                     TROPONIN            Timed               2018  



                                         5:52 PM CST  

 

                                        



Results for this procedure are in the results section.



                     CT ANGIOGRAM PE CHEST     Routine             2018  



                                         5:30 PM CST  



after 2018



Results

* Estimated GFR (2018  4:22 AM CST)





   



                 Component       Value           Ref Range       Performed At

 

   



                 Estimated GFR     66              mL/min/1.73 m2     [facility]



                           Comment:                  [facility]



                                         CatergoryUnitsInte  



                                         rpretation  



                                         G1  



                                         >=90 Normal or high  



                                         G2  



                                         60-89Mildly decreased  



                                         [id number]  



                                         Mildly to moderately  



                                         decreased  



                                         U7q62-05  



                                         Moderately to severely  



                                         decreased  



                                         G4  



                                         15-29Severely  



                                         decreased  



                                         G5  



                                         <15Kidney failure  



                                         The eGFR was calculated using  



                                         the Chronic Kidney Disease  



                                         Epidemiology Collaboration  



                                         (CKD-EPI) equation.  



                                         Interpretation is based on  



                                         recommendations of the  



                                         National Kidney  



                                         Foundation-Kidney Disease  



                                         Outcomes Quality  



                                         Initiative (NKF-KDOQI)  



                                         published in 2014.  













                                         Specimen

 





                                         Plasma specimen









   



                 Performing Organization     Address         City/State/Zipcode     Phone Number

 

   



                     [facility]     [address] [place] [address] 



                                         PATHOLOGY AND GENOMIC   



                                         MEDICINE   

 

   



                     [facility]     [address]      [place], [place] [address] [place] [address]   





* CBC with platelet and differential (2018  4:22 AM CST)





   



                 Component       Value           Ref Range       Performed At

 

   



                 WBC             10.78           4.50 - 11.00 k/uL     [facility]

 

   



                 RBC             4.79            4.40 - 6.00 m/uL     [facility]

 

   



                 HGB             13.4 (L)        14.0 - 18.0 g/dL     [facility]

 

   



                 HCT             42.0            41.0 - 51.0 %     [facility]

 

   



                 MCV             87.7            82.0 - 100.0 fL     [facility]

 

   



                 MCH             28.0            27.0 - 34.0 pg     [facility]

 

   



                 MCHC            31.9            31.0 - 37.0 g/dL     [facility]

 

   



                 RDW - SD        47.2            37.0 - 55.0 fL     [facility]

 

   



                 MPV             9.2             8.8 - 13.2 fL     [facility]

 

   



                 Platelet count     300             150 - 400 k/uL     [facility]

 

   



                 Nucleated RBC     0.00            /100 WBC        [facility]

 

   



                 Neutrophils     75.0 (H)        39.0 - 69.0 %     [facility]

 

   



                 Lymphocytes     15.0 (L)        25.0 - 45.0 %     [facility]

 

   



                 Monocytes       6.8             0.0 - 10.0 %     [facility]

 

   



                 Eosinophils     2.3             0.0 - 5.0 %     [facility]

 

   



                 Basophils       0.6             0.0 - 1.0 %     [facility]













                                         Specimen

 





                                         Blood









   



                 Performing Organization     Address         City/[facility]     Phone Number

 

   



                     [address] [place] [address] 



                                         PATHOLOGY AND GENOMIC   



                                         MEDICINE   

 

   



                     [address]      [address]   





* Basic metabolic panel (2018  4:22 AM CST)





   



                 Component       Value           Ref Range       Performed At

 

   



                 Sodium          142             135 - 148 mEq/L     [facility]

 

   



                 Potassium       3.8             3.5 - 5.0 mEq/L     [facility]

 

   



                 Chloride        103             98 - 112 mEq/L     [facility]

 

   



                 CO2             28              24 - 31 mEq/L     [facility]

 

   



                 Anion gap       11@ANIO         7 - 15 mEq/L     [facility]

 

   



                 BUN             20              8 - 23 mg/dL     [facility]

 

   



                 Creatinine      1.10            0.70 - 1.20 mg/dL     [facility]

 

   



                 Glucose         126 (H)         65 - 99 mg/dL     [facility]

 

   



                 Calcium         9.2             8.8 - 10.2 mg/dL     [facility]













                                         Specimen

 





                                         Plasma specimen









   



                 Performing Organization     Address         City/State/[facility]     Phone Number

 

   



                     [address] [place] [address] 



                                         PATHOLOGY AND GENOMIC   



                                         MEDICINE   

 

   



                     [address]      [address]   





* Troponin (2018 12:28 AM CST)



Only the most recent of 2 results within the time period is included.





   



                 Component       Value           Ref Range       Performed At

 

   



                 Troponin        <0.300          0.000 - 0.300 ng/mL     [facility]



                           Comment:                  [facility]



                                         0.30 - 1.49  



                                         ng/mlMay  



                                         indicate increased risk of  



                                         acute  



                                           



                                          coronary  



                                         syndrome.  



                                           



                                           



                                         >=1.5  



                                         ng/ml  



                                         Consistent with acute  



                                         myocardial  



                                           



                                          infarction.  



                                           



                                           



                                           



                                           



                                           



                                         The diagnostic value of a  



                                         single normal or  



                                         non-diagnostic  



                                         result is  



                                         questionable.Serial  



                                         samples at 2-6 hour intervals  



                                         are required to rule out acute  



                                         myocardial injury.  













                                         Specimen

 





                                         Plasma specimen









   



                 Performing Organization     Address         City[facility]     Phone Number

 

   



                     [address] [place] [address] 



                                         PATHOLOGY AND GENOMIC   



                                         MEDICINE   

 

   



                     [facility]     [address] [place] [address] 



                                         [facility]   





* CT Angiogram Pe Chest (2018  5:30 PM CST)





 



                           Narrative                 Performed At

 

 



                           EXAMINATION:               [facility]



                                         CT ANGIOGRAM PE CHEST 



                                         CLINICAL HISTORY:73 years Male PE r o 



                                         TECHNIQUE:CT angiographic images of the chest were obtained during 



                                         intravenous administration of iodinated contrast. Computerized reformatted 



                                         images and 3-D MIP images were also obtained and archived (CT pulmonary embolus

 



                                         protocol). CT imaging was 



                                         performed with iterative reconstruction techniques and/or automated exposure 



                                         control to reduce radiation dose. 



                                         COMPARISON: 



                                         None. 



                                         FINDINGS: 



                                         CHEST: 



                                         Pulmonary arteries: Normal in size. No intraluminal filling defects. 



                                         Lungs and airways: Minimal groundglass opacities are present in the lung bases 





                                         likely related to hypoventilation. There is minimal bibasilar atelectasis. There

 



                                         are no focal consolidations or suspicious pulmonary nodules. 



                                         Pleura: Tiny dependent bilateral pleural effusions are present. No pneumothorax.

 



                                         Mediastinum and lymph nodes: No hilar or mediastinal mass or adenopathy.There 



                                         are small paratracheal, prevascular, and subcarinal lymph nodes. 



                                         Cardiovascular: The heart size is normal. Minimal coronary calcifications are 



                                         present. There are mild aortic and mitral valvular calcifications. There is no 





                                         pericardial effusion. 



                                         Upper abdomen: There are small bilateral adrenal adenomas. The upper abdomen is

 



                                         otherwise unremarkable. 



                                         Bones: The thoracic aorta is minimally atherosclerotic without evidence of 



                                         aneurysm or dissection. Moderate hypertrophic changes are present in the 



                                         thoracic spine. There are no suspicious bony abnormalities. 



                                         Other: There is moderate gynecomastia, greater on the left. 



                                         IMPRESSION: 



                                         1.No CT evidence of acute pulmonary thromboembolic disease. 



                                         2.Minimal bibasilar atelectasis with tiny bilateral dependent pleural 



                                         effusions. 



                                         3.Minimal CAD. Mild aortic and mitral valvular disease. 



                                         4.Small benign bilateral adrenal adenomas. 



                                         5.Gynecomastia, greater on the left. 



                                         [id number] 









                                        Procedure Note

 

                                        



 Interface, Radiology Results Incoming - 2018  5:42 PM CST



EXAMINATION:

CT ANGIOGRAM PE CHEST



CLINICAL HISTORY:73 years Male PE r o



TECHNIQUE:  CT angiographic images of the chest were obtained during intravenous
administration of iodinated contrast. Computerized reformatted images and 3-D 
MIP images were also obtained and archived (CT pulmonary embolus protocol). CT 
imaging was 

performed with iterative reconstruction techniques and/or automated exposure 
control to reduce radiation dose. 



COMPARISON:

None.



FINDINGS:



CHEST:



Pulmonary arteries: Normal in size. No intraluminal filling defects.



Lungs and airways: Minimal groundglass opacities are present in the lung bases 
likely related to hypoventilation. There is minimal bibasilar atelectasis. There
are no focal consolidations or suspicious pulmonary nodules. 



Pleura: Tiny dependent bilateral pleural effusions are present. No pneumothorax.



Mediastinum and lymph nodes: No hilar or mediastinal mass or adenopathy.There 
are small paratracheal, prevascular, and subcarinal lymph nodes.   



Cardiovascular: The heart size is normal. Minimal coronary calcifications are 
present. There are mild aortic and mitral valvular calcifications. There is no 
pericardial effusion.



Upper abdomen: There are small bilateral adrenal adenomas. The upper abdomen is 
otherwise unremarkable.



Bones: The thoracic aorta is minimally atherosclerotic without evidence of 
aneurysm or dissection. Moderate hypertrophic changes are present in the 
thoracic spine. There are no suspicious bony abnormalities. 



Other: There is moderate gynecomastia, greater on the left.



IMPRESSION:

                                        1.  No CT evidence of acute pulmonary thromboembolic disease.

                                        2.  Minimal bibasilar atelectasis with tiny bilateral dependent pleural effusions.



                                        3.  Minimal CAD. Mild aortic and mitral valvular disease.

                                        4.  Small benign bilateral adrenal adenomas.

                                        5.  Gynecomastia, greater on the left.











[facility]-[id number]











   



                 Performing Organization     Address         City/State/Zipcode     Phone Number

 

   



                     [facility]          [address] [place] [address] 





after 2018



Insurance







     



            Payer      Benefit     Subscriber ID     Type       Phone      Address



                                         Plan /    



                                         Group    

 

     



                 [employer]       [employer]       xxxxxxxxx       [facility]    









     



            Guarantor Name     Account     Relation to     Date of     Phone      Billing Address



                     Type                Patient             Birth  

 

     



            Luke Maguire     Personal/F     Self       1945     [phone]     [address]



                     amily               (Home)              [place] [address]







Advance Directives





Patient has advance care planning documents on file. For more information, john ham contact:



Te Johnson



[address] [place] [address]

## 2019-02-07 VITALS — DIASTOLIC BLOOD PRESSURE: 62 MMHG | SYSTOLIC BLOOD PRESSURE: 155 MMHG

## 2019-02-07 VITALS — DIASTOLIC BLOOD PRESSURE: 62 MMHG | SYSTOLIC BLOOD PRESSURE: 134 MMHG

## 2019-02-07 VITALS — DIASTOLIC BLOOD PRESSURE: 62 MMHG | SYSTOLIC BLOOD PRESSURE: 140 MMHG

## 2019-02-07 LAB
ANION GAP SERPL CALC-SCNC: 11.7 MMOL/L (ref 8–16)
BASOPHILS # BLD AUTO: 0 10*3/UL (ref 0–0.1)
BASOPHILS NFR BLD AUTO: 0.3 % (ref 0–1)
BUN SERPL-MCNC: 17 MG/DL (ref 7–26)
BUN/CREAT SERPL: 21 (ref 6–25)
CALCIUM SERPL-MCNC: 8.5 MG/DL (ref 8.4–10.2)
CHLORIDE SERPL-SCNC: 107 MMOL/L (ref 98–107)
CO2 SERPL-SCNC: 25 MMOL/L (ref 22–29)
DEPRECATED NEUTROPHILS # BLD AUTO: 7.3 10*3/UL (ref 2.1–6.9)
EGFRCR SERPLBLD CKD-EPI 2021: > 60 ML/MIN (ref 60–?)
EOSINOPHIL # BLD AUTO: 0.3 10*3/UL (ref 0–0.4)
EOSINOPHIL NFR BLD AUTO: 2.6 % (ref 0–6)
ERYTHROCYTE [DISTWIDTH] IN CORD BLOOD: 14.7 % (ref 11.7–14.4)
GLUCOSE SERPLBLD-MCNC: 116 MG/DL (ref 74–118)
HCT VFR BLD AUTO: 35.9 % (ref 38.2–49.6)
HGB BLD-MCNC: 11.2 G/DL (ref 14–18)
LYMPHOCYTES # BLD: 1.4 10*3/UL (ref 1–3.2)
LYMPHOCYTES NFR BLD AUTO: 14.1 % (ref 18–39.1)
MCH RBC QN AUTO: 26.4 PG (ref 28–32)
MCHC RBC AUTO-ENTMCNC: 31.2 G/DL (ref 31–35)
MCV RBC AUTO: 84.5 FL (ref 81–99)
MONOCYTES # BLD AUTO: 0.7 10*3/UL (ref 0.2–0.8)
MONOCYTES NFR BLD AUTO: 7.4 % (ref 4.4–11.3)
NEUTS SEG NFR BLD AUTO: 75.4 % (ref 38.7–80)
PLATELET # BLD AUTO: 224 X10E3/UL (ref 140–360)
POTASSIUM SERPL-SCNC: 3.7 MMOL/L (ref 3.5–5.1)
RBC # BLD AUTO: 4.25 X10E6/UL (ref 4.3–5.7)
SODIUM SERPL-SCNC: 140 MMOL/L (ref 136–145)

## 2019-02-07 NOTE — NUR
ISABEL DISCHARGED AT THIS TIME RX GIVEN EDUCATION AND F/U INSTRUCTIONS IV DC'D SECURED WITH 
4X4 GAUZE AND TAPE. PT WAITING FOR RIDE

## 2019-02-13 NOTE — OPERATIVE REPORT
DATE OF PROCEDURE:  February 06, 2019 

 

INDICATIONS:  Abnormal stress test.



BLOOD LOSS:  8 mL.  



ANESTHESIA:  Two percent lidocaine for local anesthesia.  Fentanyl and 

Versed for conscious sedation.  



DESCRIPTION OF PROCEDURE:  After informed consent, the patient was brought 

to the cardiac catheterization laboratory and placed on the table.  Both 

groins were painted and draped in a sterile fashion.  Lidocaine injected in 

the right groin for local anesthesia.  Right femoral artery was accessed by 

Seldinger technique, and a 5-Kosovan sheath was placed in the right femoral 

artery.  Left main artery was cannulated using a JL4 5-Kosovan catheter.  

Coronary angiogram was performed and images obtained in multiple views.  

The right coronary artery was cannulated using a 3DRC 5-Kosovan catheter.  

Coronary angiogram was performed and images obtained in multiple views.  

LV-gram was performed using a pigtail catheter.  After reviewing the 

images, we decided to intervene on the 99% mid-LAD lesion and the 60% to 

70% mid-LAD lesion beyond the 1st diagonal branch.  The 5-Kosovan sheath was 

exchanged for a 6-Kosovan sheath over a guidewire.  The left main was 

cannulated using a XP 3.5 6-Kosovan guide.  A Prowater wire was manipulated 

and placed in the distal LAD.  A run through wire was manipulated and 

placed in the 1st diagonal branch.  The lesion was dilated using a 2.25 x 

12 mm Emerge balloon.  Multiple dilatations were performed.  The balloon 

was removed and a 2.75 x 34 mm Resolute [place] drug-eluting stent was 

deployed across the mid-lesions at 14 atmospheres for about 20 seconds.  

The stent carrier was removed and the wire and the guide was removed.  The 

stent was post dilated using a 3 x 15 mm noncompliant Quantum apex balloon. 

 Multiple dilatations were performed all along the course of the stent.  

Patient was given aspirin, Plavix and Angiomax during the procedure.  

Patient tolerated the procedure without any complications.



REPORT

LEFT MAIN:  Is of normal caliber and no significant stenosis.



LEFT ANTERIOR DESCENDING:  Normal caliber and 99% mid-lesion.  There is 

another 60% to 70% mid-lesion after the origin of the 1st diagonal branch.  



LEFT CIRCUMFLEX:  Normal caliber with 40% to 50% mid-lesion.  

RIGHT CORONARY ARTERY:  Normal caliber and has 70% mid-lesion and another 

40% mid-lesion. 

LV-GRAM:  The anterior apical wall is hypokinetic.  Overall ejection 

fraction is 30% to 35%. 

HEMODYNAMICS:  Aortic pressure is 132/74.  LV pressure is 167/17.  LVEDP is 

37.  



Balloon used is a 2.25 x 12 mm Emerge.  Stent used is a 2.75 x 34 mm 

Resolute [place] drug-eluting stent that was deployed in the mid-LAD.  A 3 x 

15 mm noncompliant Quantum apex balloon was used to post dilate the stent.  

There was zero percent residual stenosis and JHONATHAN-III flow noted.  











DD:  02/13/2019 12:32

DT:  02/13/2019 13:32

Job#:  [id number]

## 2019-11-19 ENCOUNTER — HOSPITAL ENCOUNTER (OUTPATIENT)
Dept: HOSPITAL 88 - MRI | Age: 74
End: 2019-11-19
Attending: OPHTHALMOLOGY
Payer: MEDICARE

## 2019-11-19 DIAGNOSIS — H05.243: Primary | ICD-10-CM

## 2019-12-06 ENCOUNTER — HOSPITAL ENCOUNTER (EMERGENCY)
Dept: HOSPITAL 88 - ER | Age: 74
Discharge: HOME | End: 2019-12-06
Payer: COMMERCIAL

## 2019-12-06 VITALS — BODY MASS INDEX: 41.75 KG/M2 | WEIGHT: 315 LBS | HEIGHT: 73 IN

## 2019-12-06 DIAGNOSIS — I10: Primary | ICD-10-CM

## 2019-12-06 DIAGNOSIS — I48.91: ICD-10-CM

## 2019-12-06 DIAGNOSIS — I25.10: ICD-10-CM

## 2019-12-06 DIAGNOSIS — I73.9: ICD-10-CM

## 2019-12-06 DIAGNOSIS — K21.9: ICD-10-CM

## 2019-12-06 PROCEDURE — 99282 EMERGENCY DEPT VISIT SF MDM: CPT

## 2019-12-06 NOTE — XMS REPORT
Summary of Care

                             Created on: 2019



Luke Maguire

External Reference #: [id number]

: 1945

Sex: Male



Demographics







                          Address                   [address] [place]  [address]

 

                          Home Phone                [phone]

 

                          Preferred Language        English

 

                          Marital Status            

 

                          Scientologist Affiliation     Unknown

 

                          Race                      White

 

                          Ethnic Group              Non-





Author







                          Author                    [facility]

 

                          Organization              [facility]

 

                          Address                   Unknown

 

                          Phone                     Unavailable







Support







                Name            Relationship    Address         Phone

 

                Bianca Maguire    [employer]            Unknown         [phone]







Care Team Providers







                    Care Team Member Name    Role                Phone

 

                    KalebPro S     PCP                 [phone]







Encounter Details







                          Care Team                 Description



                     Date                Type                Department  

 

                                        



Doctor Unassigned, [place]



[address] [place] [address]                      



                     2019          Orders Only         [facility]  



                                         [address] [place] [address]  







Allergies







                                        Comments



                 Active Allergy     Reactions       Severity        Noted Date 

 

                                         



                     Azithromycin        Hives               2019 



documented as of this encounter (statuses as of 2019)



Medications

No known medicationsdocumented as of this encounter (statuses as of 2019)



Active Problems







 



                           Problem                   Noted Date

 

 



                           S/P drug eluting coronary stent placement     2019

 

 



                                         Overview:



                                         Resolute [place] 3.5mm x 12mm drug eluting stent to RCA by Dr. Wolfe on



                                         19



documented as of this encounter (statuses as of 2019)



Social History







                                        Date



                 Tobacco Use     Types           Packs/Day       Years Used 

 

                                         



                                         Never Assessed    









 



                           Sex Assigned at Birth     Date Recorded

 

 



                                         Not on file 









                                        Industry



                           Job Start Date            Occupation 

 

                                        Not on file



                           Not on file               Not on file 









                                        Travel End



                           Travel History            Travel Start 

 





                                         No recent travel history available.



documented as of this encounter



Last Filed Vital Signs

Not on filedocumented in this encounter



Plan of Treatment







                          Care Team                 Description



                     Date                Type                Specialty  

 

                                        



Luisito Wolfe MD



[address] [facility]



[address]



[place], [place] [id number] [phone] (Fax)





2, Clc Cardiac Proc Room                 



                     2019          Appointment         Cardiac Cath Lab  









   



                 Health Maintenance     Due Date        Last Done       Comments

 

   



                           HEPATITIS C (HCV) SCREEN     1945  

 

   



                           DTaP,Tdap,and Td Vaccines     1964  



                                         (1 - Tdap)   

 

   



                           COLONOSCOPY               1995  

 

   



                           Zoster Recombinant        1995  



                                         Vaccine (SHINGRIX) (1 of   



                                         2)   

 

   



                           Medicare Wellness Visit     2010  

 

   



                           PNEUMOCOCCAL VACCINES 65+     2010  



                                         (1 of 2 - PCV13)   

 

   



                           INFLUENZA VACCINE (#1)     2019  



documented as of this encounter



Procedures







                                        Comments



                 Procedure Name     Priority        Date/Time       Associated Diagnosis 

 

                                         



                     ASSIGNMENT OF BENEFITS     Routine             2019  



                                         10:43 AM CDT  



documented in this encounter



Results

Not on filedocumented in this encounter



Insurance







                                        Type



            Payer      Benefit     Subscriber ID     Effective     Phone      Address 



                           Plan /                    Dates   



                                         Group     

 

                                        Medicare Adv HMO/POS



                 [employer]     [employer]        [id number]       2019-P   



                           [employer]                resent   



                                         CHOICE     



documented as of this encounter

## 2019-12-06 NOTE — XMS REPORT
Summary of Care

                             Created on: 09/10/2019



Luke Maguire

External Reference #: [id number]

: 1945

Sex: Male



Demographics







                          Address                   [address] [place]  [address]

 

                          Home Phone                [phone]

 

                          Preferred Language        English

 

                          Marital Status            

 

                          Confucianism Affiliation     Unknown

 

                          Race                      White

 

                          Ethnic Group              Non-





Author







                          Author                    [facility]

 

                          Organization              [facility]

 

                          Address                   Unknown

 

                          Phone                     Unavailable







Support







                Name            Relationship    Address         Phone

 

                No contact      [employer]            Unknown         [phone]

 

                Bianca Maguire    [employer]            Unknown         [phone]







Care Team Providers







                    Care Team Member Name    Role                Phone

 

                    Pro Manuel     PCP                 [phone]







Reason for Visit

* 





 



                           Reason                    Comments

 

 



                                         Pre-Visit Planning 









Encounter Details







                          Care Team                 Description



                     Date                Type                Department  

 

                                        



Luisito Wolfe MD



[address] [facility] [address]



[place] [address] [phone] (Fax)                      Pre-Visit Planning



                     09/10/2019          Telephone           [facility]  



                                         - Cath Lab, [address] [address] [phone]  







Allergies







                                        Comments



                 Active Allergy     Reactions       Severity        Noted Date 

 

                                         



                     Azithromycin        Hives               2019 



documented as of this encounter (statuses as of 09/10/2019)



Medications







                          End Date                  Status



              Medication     Sig          Dispensed     Refills      Start  



                                         Date  

 

                          09/10/2019                Discontinued



                     metoprolol succinate XL     Take 25 mg by       0   



                           25 mg 24 hr tablet        mouth daily.     

 

                          09/10/2019                Discontinued



                     hydrALAZINE 25 mg tablet     Take 25 mg by       0   



                                         mouth 2 (two)     



                                         times daily.     

 

                          09/10/2019                Discontinued



                     amLODIPine 10 mg tablet     Take 10 mg by       0   



                                         mouth daily.     

 

                          09/10/2019                Discontinued



                     ASPIRIN LOW DOSE ORAL     Take 81 mg by       0   



                                         mouth daily.     

 

                          09/10/2019                Discontinued



                     tiZANidine 4 mg tablet     Take 4 mg by        0   



                                         mouth at     



                                         bedtime.     

 

                          09/10/2019                Discontinued



              clopidogrel 75 mg     Take 1 tablet     30 tablet     3              



                     tabletIndications: Chest     by mouth            9  



                           pain, unspecified type,     daily.     



                                         S/P drug eluting coronary      



                                         stent placement      

 

                          09/10/2019                Discontinued



              furosemide 40 mg     Take 1 tablet     30 tablet     0              



                     tabletIndications: Chest     by mouth            9  



                           pain, unspecified type,     daily.     



                                         S/P drug eluting coronary      



                                         stent placement      

 

                                                    Suspended



                 atorvastatin 40 mg tablet     TK 1 T PO QD      6                 



                                         9  



documented as of this encounter (statuses as of 09/10/2019)



Active Problems







 



                           Problem                   Noted Date

 

 



                           S/P drug eluting coronary stent placement     2019

 

 



                                         Overview:



                                         Resolute [place] 3.5mm x 12mm drug eluting stent to RCA by Dr. Wolfe on



                                         19



documented as of this encounter (statuses as of 09/10/2019)



Social History







                                        Date



                 Tobacco Use     Types           Packs/Day       Years Used 

 

                                         



                                         Never Assessed    









 



                           Sex Assigned at Birth     Date Recorded

 

 



                                         Not on file 









                                        Industry



                           Job Start Date            Occupation 

 

                                        Not on file



                           Not on file               Not on file 









                                        Travel End



                           Travel History            Travel Start 

 





                                         No recent travel history available.



documented as of this encounter



Last Filed Vital Signs

Not on filedocumented in this encounter



Plan of Treatment







                          Care Team                 Description



                     Date                Type                Specialty  

 

                                        



Luisito Wolfe MD



[address]



[address]



[place], [place] [id number] [phone] (Fax)





2, Clc Cardiac Proc Room                 



                     2019          Appointment         Cardiac Cath Lab  









   



                 Health Maintenance     Due Date        Last Done       Comments

 

   



                           HEPATITIS C (HCV) SCREEN     1945  

 

   



                           DTaP,Tdap,and Td Vaccines     1964  



                                         (1 - Tdap)   

 

   



                           COLONOSCOPY               1995  

 

   



                           Zoster Recombinant        1995  



                                         Vaccine (SHINGRIX) (1 of   



                                         2)   

 

   



                           Medicare Wellness Visit     2010  

 

   



                           PNEUMOCOCCAL VACCINES 65+     2010  



                                         (1 of 2 - PCV13)   

 

   



                           INFLUENZA VACCINE (#1)     2019  



documented as of this encounter



Results

Not on filedocumented in this encounter



Insurance







                                        Type



            Payer      Benefit     Subscriber ID     Effective     Phone      Address 



                           Plan /                    Dates   



                                         Group     

 

                                        Medicare Adv HMO/POS



                 [employer]     [employer]        [id number]       2019-P   



                           [employer]                resent   



                                         CHOICE     



documented as of this encounter

## 2019-12-06 NOTE — XMS REPORT
Summary of Care

                             Created on: 2019



Luke Maguire

External Reference #: [id number]

: 1945

Sex: Male



Demographics







                          Address                   [address] [id number]

 

                          Home Phone                [phone]

 

                          Preferred Language        English

 

                          Marital Status            

 

                          Gnosticism Affiliation     Unknown

 

                          Race                      White

 

                          Ethnic Group              Non-





Author







                          Author                    [facility]

 

                          Organization              [facility]

 

                          Address                   Unknown

 

                          Phone                     Unavailable







Support







                Name            Relationship    Address         Phone

 

                No contact      ECON            Unknown         [phone]

 

                Bianca Maguire    ECON            Unknown         [phone]







Care Team Providers







                    Care Team Member Name    Role                Phone

 

                    Pro Manuel     PCP                 [phone]







Reason for Referral

* Other (Routine)





                          Referred By Contact       Referred To Contact



                 Status          Reason          Specialty       Diagnoses /  



                                         Procedures  

 

                                        



Matilde Baird, [address] [address]



Phone: [phone]



Fax: [phone]                       



Nell Mortensen MD



[address] [facility]



[address] [place] [address]



Phone: [phone]



Fax: [phone]



                 Closed          Patient is      Cardiology      Diagnoses  



                           Established with a        Chest pain,  



                           Specific Provider         unspecified type  



                                         S/P drug eluting  



                                         coronary stent  



                                         placement

  



                                         P  



                                         rocedures  



                                         Discharge  



                                         Follow-up:  



                                         Specialty Provider  



                                         NELL MORTENSEN; 2  



                                         Weeks  











Reason for Visit

*  (Routine)





                          Referred By Contact       Referred To Contact



                 Status          Reason          Specialty       Diagnoses /  



                                         Procedures  

 

                                        



Nell Mortensen MD



[address]



[address]



[place] [address]



Phone: [phone]



Fax: [phone]                       



Nell Mortensen MD



[address]



[address]



[place] [address]



Phone: [phone]



Fax: [phone]



                     Closed              IM-CARDIOVASCULA     Diagnoses  



                           R DISEASE /               [facility]

  



                           Cardiac Cath Lab          P  



                                         rocedures  



                                         CARDIAC CATH  



                                         REQUEST FOR  



                                         SERVICE  



                                         MT CATH PLACEMENT  



                                         & NJX CORONARY ART  



                                         ANGIO IMG S&I  



                                         MT CATH PLMT L HRT  



                                         & ARTS W/NJX &  



                                         ANGIO IMG S&I  



                                         MT ENDOLUMINAL  



                                         CORONARY IVUS OCT  



                                         I&R INITIAL VESSEL  



                                         MT IV DOP VAN&/OR  



                                         PRESS C/KEVIN RSRV  



                                         JULIET 1ST VSL  



                                         MT PRQ TRLUML  



                                         CORONARY  



                                         ANGIOPLASTY ONE  



                                         ART/BRANCH  



                                         MT PRQ TRLUML  



                                         CORONARY  



                                         ANGIO/ATHERECT ONE  



                                         ART/BRNCH  



                                         MT PRQ TRLUML  



                                         CORONARY STENT  



                                         W/ANGIO ONE  



                                         ART/BRNCH  



                                         MT PRQ TRLUML  



                                         CORONRY  



                                         STENT/ATH/ANGIO  



                                         ONE ART/BRNCH  



                                         LHC/PCI  











Encounter Details







                          Care Team                 Description



                     Date                Type                Department  

 

                                        



Nell Mortensen MD



[address]



[address]



[place] [address]



[phone] (Fax)





Oscar Cook MD



[address]



[address]



[place], [place] [address] [phone] (Fax)





2, [facility] Cardiac Proc Room                Chest pain, unspecified type (Primary Dx); 

S/P drug eluting coronary stent placement



                     2019          [facility]  



                           Encounter                 - Cath Lab, [address] [address] [phone]  







Allergies







                                        Comments



                 Active Allergy     Reactions       Severity        Noted Date 

 

                                         



                     Azithromycin        Hives               2019 



documented as of this encounter (statuses as of 2019)



Medications







                          End Date                  Status



              Medication     Sig          Dispensed     Refills      Start  



                                         Date  

 

                          09/10/2019                Discontinued



                     metoprolol succinate XL     Take 25 mg by       0   



                           25 mg 24 hr tablet        mouth daily.     

 

                          09/10/2019                Discontinued



                     hydrALAZINE 25 mg tablet     Take 25 mg by       0   



                                         mouth 2 (two)     



                                         times daily.     

 

                          09/10/2019                Discontinued



                     amLODIPine 10 mg tablet     Take 10 mg by       0   



                                         mouth daily.     

 

                          09/10/2019                Discontinued



                     ASPIRIN LOW DOSE ORAL     Take 81 mg by       0   



                                         mouth daily.     

 

                          2019                Discontinued



                     furosemide 40 mg tablet     Take 40 mg by       0   



                                         mouth daily.     

 

                          09/10/2019                Discontinued



                     tiZANidine 4 mg tablet     Take 4 mg by        0   



                                         mouth at     



                                         bedtime.     

 

                          09/10/2019                Discontinued



              clopidogrel 75 mg     Take 1 tablet     30 tablet     3              



                     tabletIndications: Chest     by mouth            9  



                           pain, unspecified type,     daily.     



                                         S/P drug eluting coronary      



                                         stent placement      

 

                          09/10/2019                Discontinued



              furosemide 40 mg     Take 1 tablet     30 tablet     0              



                     tabletIndications: Chest     by mouth            9  



                           pain, unspecified type,     daily.     



                                         S/P drug eluting coronary      



                                         stent placement      



documented as of this encounter (statuses as of 2019)



Active Problems







 



                           Problem                   Noted Date

 

 



                           S/P drug eluting coronary stent placement     2019

 

 



                                         Overview:



                                         [employer] 3.5mm x 12mm drug eluting stent to RCA by Dr. Mortensen on



                                         19



documented as of this encounter (statuses as of 2019)



Social History







                                        Date



                 Tobacco Use     Types           Packs/Day       Years Used 

 

                                         



                                         Never Assessed    









 



                           Sex Assigned at Birth     Date Recorded

 

 



                                         Not on file 









                                        Industry



                           Job Start Date            Occupation 

 

                                        Not on file



                           Not on file               Not on file 









                                        Travel End



                           Travel History            Travel Start 

 





                                         No recent travel history available.



documented as of this encounter



Last Filed Vital Signs







                    Reading             Time Taken          Comments



                                         Vital Sign   

 

                    138/92              2019  4:45 PM CDT     



                                         Blood Pressure   

 

                    66                  2019  4:45 PM CDT     



                                         Pulse   

 

                    36.3 C (97.3 F)    2019  1:13 PM CDT     



                                         Temperature   

 

                    23                  2019  4:45 PM CDT     



                                         Respiratory Rate   

 

                    96%                 2019  4:45 PM CDT    Pulse ox censor to right thumb



                                         Oxygen Saturation   

 

                    -                   -                    



                                         Inhaled Oxygen   



                                         Concentration   

 

                    145.2 kg (320 lb)    2019  1:02 PM CDT     



                                         Weight   

 

                    185.4 cm (6' 1")    2019  1:02 PM CDT     



                                         Height   

 

                    42.22               2019  1:02 PM CDT     



                                         Body Mass Index   



documented in this encounter



Discharge Summaries

* Matilde Baird FNP - 2019  5:29 PM CDT



Formatting of this note might be different from the original.

Cardiac Cath/EP Lab OP Discharge Summary



Date of Service/Surgery/Discharge: 2019 5:29 PM

 

Performing Physician: Dr. Nell Mortensen

PCP: Pro Manuel

 

PRIMARY DIAGNOSIS: CAD



 

PRINCIPAL PROCEDURE: selective coronary angiography, left heart cath, PCI with s
tent



 

DISCHARGE CONDITION: Good



DIET: Pre-procedure diet as tolerated

 

ACTIVITY: No strenuous activity or heavy lifting  x7 days; no driving x 2 days

 

DISCHARGE MEDICATIONS: 



Current Discharge Medication List 

 



START taking these medications 

 Details 

clopidogrel 75 mg tablet Take 1 tablet by mouth daily.

Qty: 30 tablet, Refills: 3 

 Associated Diagnoses: Chest pain, unspecified type; S/P drug eluting coronary s
tent placement 

 

 



CONTINUE these medications which have CHANGED 

 Details 

furosemide 40 mg tablet Take 1 tablet by mouth daily.

Qty: 30 tablet, Refills: 0 

 Associated Diagnoses: Chest pain, unspecified type; S/P drug eluting coronary s
tent placement 

 

 



CONTINUE these medications which have NOT CHANGED 

 Details 

amLODIPine 10 mg tablet Take 10 mg by mouth daily. 

 

ASPIRIN LOW DOSE ORAL Take 81 mg by mouth daily. 

 

hydrALAZINE 25 mg tablet Take 25 mg by mouth 2 (two) times daily. 

 

metoprolol succinate XL 25 mg 24 hr tablet Take 25 mg by mouth daily. 

 

tiZANidine 4 mg tablet Take 4 mg by mouth at bedtime. 

 

 



Pt was given Rx for clopidogrel and lasix (out of refill)

 

WOUND CARE: 

Can remove dressing tomorrow, ok to shower tomorrow, do not submerge for 7-10 da
ys

 

DISCHARGE: Home

 

FOLLOW-UP APPOINTMENT:  2 weeks with Dr. Mortensen

 

Patient was given discharge instructions.



RITA Weaver  2019  5:29 PM



Discussed with Dr. Mortensen





Electronically signed by Nell Mortensen MD at 2019  5:32 PM CDT

documented in this encounter



Discharge Instructions

* Instructions* 



 Shay, Madeleine M, RN - 2019



Formatting of this note might be different from the original.

Patient Discharge Instructions



Follow instructions as indicated below:

Discharge Orders 

Activity As Tolerated 



Lift nothing heavier than 5 pounds for 2 weeks 



Do not operate a motorized vehicle for 2 days 



Keep area dry and clean 



Do not remove band-aid for the first 24 hours after procedure 



Do not soak in bathtub or hot tub for the first 24 hours after procedure 



Watch for bleeding, swelling, pain, fever, and any discharge call the Cath Lab (
during hours) 

Order Comments: At nights, weekends or holidays, call the [facility] hospital 
at [phone]. Ask the  to page the Cardiac Cath Fellow who is on-ca
ll. 



Avoid making important life decisions for the first 48 hours 



No strenuous activity for 72 hours  



Drink plenty of water 



Continue previous outpatient medications 



Discharge Follow-up: Specialty Provider NELL MORTENSEN; 2 Weeks 



To Provider: NELL MORTENSEN [[id number]]  

Patient's Preferred Location: [place]  

Discharge Disposition: HOME, ([facility])  

When (Patients with risk for unplanned readmission score over 16 or those noted 
as Hospital Dependent should follow up within 7 days with PCP or primary DX spec
ialist): 2 Weeks  



Cardiac (2 gm Sodium, Low Fat, Low Cholesterol) Diet; Texture: Regular. 



Texture Regular.  

Diabetic: No  



Discharge Condition - 



Discharge Condition: GOOD  



Discharge Activity 



Discharge Activity: No Heavy Lifting or Strenuous Activity  

Discharge Activity: As Tolerated  



Resume pre procedure diet 

Order Comments: Resume pre procedure diet 



No VTE Prophylaxis given- Patient low risk for VTE; Not ordered during hospitali
zation 



Weight: In general, sudden weight gains or losses should be reported to your pro
vider.  Cardiac patients should weigh daily and notify their provider for a weig
ht gain of 3 pounds per day or 5 pounds per week.



Follow-up appointments:



To schedule other appointments, call the [facility] at [phone]
[phone] or [phone].  You may also make appointments online by going to [email or web address]
[email or web address] and follow the Request Appointment quicklink.



Take Home Medications

These are medications ordered for you by your healthcare provider. Do not take a
ny other medications or supplements unless advised by your healthcare provider.



Current Discharge Medication List 

 

START taking these medications 

 Details 

clopidogrel 75 mg tablet Take 1 tablet by mouth daily.

Qty: 30 tablet, Refills: 3 

 Associated Diagnoses: Chest pain, unspecified type; S/P drug eluting coronary s
tent placement 

 

 

CONTINUE these medications which have CHANGED 

 Details 

furosemide 40 mg tablet Take 1 tablet by mouth daily.

Qty: 30 tablet, Refills: 0 

 Associated Diagnoses: Chest pain, unspecified type; S/P drug eluting coronary s
tent placement 

 

 

CONTINUE these medications which have NOT CHANGED 

 Details 

amLODIPine 10 mg tablet Take 10 mg by mouth daily. 

 

ASPIRIN LOW DOSE ORAL Take 81 mg by mouth daily. 

 

hydrALAZINE 25 mg tablet Take 25 mg by mouth 2 (two) times daily. 

 

metoprolol succinate XL 25 mg 24 hr tablet Take 25 mg by mouth daily. 

 

tiZANidine 4 mg tablet Take 4 mg by mouth at bedtime. 

 

 



Medications:  Your doctor may prescribe medicine to prevent blood clots.  You ma
y also have to take medicine to prevent chest pain.  Take your medicine as usual
 after the procedure unless your doctor has told you to stop. Any changes in you
r medicine's schedule will be explained to you.



For questions regarding follow-up instructions call the [facility] ward at [phone] or [phone]



For worsening symptoms/changing condition/problems or questions: 

 During normal business hours call the [facility] Cardiac Cath Lab at [phone]
. 

 At nights, weekends or holidays, call the [facility] hospital  at [phone]
[phone].  Ask the  to page the Cardiac Cath Fellow who is on-call.

 Emergency: Go to the closest emergency room or call [phone]



Signs and Symptoms of a Problem:  Call your doctor if you have any of the follow
ing problems:

 Fever

 Swelling, pain, redness around the puncture site

 Foul smell or drainage from the site

 Odd changes in sensations, like numbness, tingling, coldness or pain in the a
rm or leg where the catheter was inserted.



If you start Bleeding: Apply pressure to the site.  If the bleeding continues, h
ave someone call your doctor and make arrangements to see him/her.  Please follo
w the doctor's directions.



Our Goal is to Always Provide You with Very Good Care!

We will be mailing you a survey, Please complete and return at your convenience.

Thank You



TOBACCO AVOIDANCE



Exposure to tobacco either from smoking or from second hand (environmental) smok
e or smokeless tobacco (snuff) is damaging to your health.  This information is 
to encourage everyone to avoid tobacco exposure.  It is recommended that you:

? If you smoke or use smokeless tobacco, we encourage you to quit.

? If you have already quit smoking, continue your good work! 

? If you do not smoke or use smokeless tobacco, do not start.

? Avoid secondhand smoke.



Additional Resources

You may want to contact these organizations for further information on smoking a
nd how to quit.

American Lung Association, http://www.lungusa.org/stop-smoking/ 

American Cancer Society, http://www.cancer.org/Healthy/StayAwayfromTobacco/index
 

American Heart Association, http://www.heart.org/HEARTORG/GettingHealthy/QuitSmo
carlee/Quit-Smoking_[facility]_001085_SubHomePage.jsp





* Attachments



The following attachments cannot be sent through Care Everywhere.* Cardiac 
  Catheterization, Bleeding or Hematoma After (English)



documented in this encounter



Plan of Treatment







                          Care Team                 Description



                     Date                Type                Specialty  

 

                                        



Nell Mortensen MD



[address] [facility]



[address]



[place] [address]



[phone] (Fax)





2, [facility] Cardiac Proc Room                 



                     2019          Appointment         Cardiac Cath Lab  









                                        Order Schedule



                 Name            Type            Priority        Associated Diagnoses 

 

                                        ONCE for 1 Occurrences starting 2019 until 2019



                     EKG-12 LEAD ROUTINE     HEART STATION       STAT  

 

                                        TOMORROW AM AT 0600 for 1 Occurrences starting 2019 until 2019



                     EKG-12 LEAD ROUTINE     HEART STATION       Routine  









   



                 Health Maintenance     Due Date        Last Done       Comments

 

   



                           HEPATITIS C (HCV) SCREEN     1945  

 

   



                           DTaP,Tdap,and Td Vaccines     1964  



                                         (1 - Tdap)   

 

   



                           COLONOSCOPY               1995  

 

   



                           Zoster Recombinant        1995  



                                         Vaccine (SHINGRIX) (1 of   



                                         2)   

 

   



                           Medicare Wellness Visit     2010  

 

   



                           PNEUMOCOCCAL VACCINES 65+     2010  



                                         (1 of 2 - PCV13)   

 

   



                           INFLUENZA VACCINE (#1)     2019  



documented as of this encounter



Procedures







                                        Comments



                 Procedure Name     Priority        Date/Time       Associated Diagnosis 

 

                                         



                     EXTERNAL PROVIDER RECORDS     Routine             2019  



                                         12:01 AM CDT  

 

                                         



                     EXTERNAL PROVIDER RECORDS     Routine             2019  



                                         12:01 AM CDT  

 

                                         



                     EXTERNAL PROVIDER RECORDS     Routine             2019  



                                         12:01 AM CDT  

 

                                         



                     CATH PROCEDURE LOG     Routine             2019  



                                         3:36 PM CDT  

 

                                        



Results for this procedure are in the results section.



                 CBC WITH DIFFERENTIAL     Routine         2019      Chest pain, unspecified 



                           1:24 PM CDT               type 

 

                                        



Results for this procedure are in the results section.



                 PROTHROMBIN TIME / INR     Routine         2019      Chest pain, unspecified 



                           1:24 PM CDT               type 

 

                                        



Results for this procedure are in the results section.



                 CBC WITH DIFF     Routine         2019      Chest pain, unspecified 



                           1:24 PM CDT               type 

 

                                        



Results for this procedure are in the results section.



                 BASIC METABOLIC PANEL     Routine         2019      Chest pain, unspecified 



                     (NA, K, CL, CO2, GLUCOSE,      1:24 PM CDT         type 



                                         BUN, CREATININE, CA)    

 

                                         



                     OUTPATIENT CARDIAC     Routine             2019  



                           CATHETERIZATION DOCUMENTS      12:01 AM CDT  



documented in this encounter



Results

* CBC WITH DIFFERENTIAL (2019  1:24 PM CDT)





    



              Component     Value        Ref Range     Performed At     Pathologist



                                         Signature

 

    



                 WBC             8.35            4.20 - 10.70     [facility] 



                           10*3/L                  [facility] 

 

    



                 RBC             4.91            4.26 - 5.52 10*6/L     [facility] 



                                         [facility] 

 

    



                 HGB             11.6 (L)        12.2 - 16.4 g/dL     [facility] 



                                         [facility] 

 

    



                 HCT             38.6            38.4 - 49.3 %     [facility] 



                                         [facility] 

 

    



                 MCV             78.6 (L)        81.7 - 95.6 fL     [facility] 



                                         [facility] 

 

    



                 MCH             23.6 (L)        26.1 - 32.7 pg     [facility] 



                                         [facility] 

 

    



                 MCHC            30.1 (L)        31.2 - 35.0 g/dL     [facility] 



                                         [facility] 

 

    



                 RDW-SD          55.5 (H)        38.5 - 51.6 fL     [facility] 



                                         [facility] 

 

    



                 RDW-CV          19.9 (H)        12.1 - 15.4 %     [facility] 



                                         [facility] 

 

    



                 PLT             261             150 - 328 10*3/L     [facility] 



                                         [facility] 

 

    



                 MPV             8.5 (L)         9.8 - 13.0 fL     [facility] 



                                         [facility] 

 

    



                 NRBC/100 WBC     0.0             0.0 - 10.0 /100 WBCs     [facility] 



                                         [facility] 

 

    



                 NRBC x10^3      <0.01           10*3/L        [facility] 



                                         [facility] 

 

    



                 GRAN MAT (NEUT)     67.9            %               [facility] 



                           %                         [facility] 

 

    



                 IMM GRAN %      0.20            %               [facility] 



                                         [facility] 

 

    



                 LYMPH %         19.8            %               [facility] 



                                         [facility] 

 

    



                 MONO %          6.8             %               [facility] 



                                         [facility] 

 

    



                 EOS %           4.7             %               [facility] 



                                         [facility] 

 

    



                 BASO %          0.6             %               [facility] 



                                         [facility] 

 

    



                 GRAN MAT        5.67            1.99 - 6.95 10*3/uL     [facility] 



                           x10^3(ANC)                [facility] 

 

    



                 IMM GRAN x10^3     <0.03           0.00 - 0.06 10*3/uL     [facility] 



                                         [facility] 

 

    



                 LYMPH x10^3     1.65            1.09 - 3.23 10*3/uL     [facility] 



                                         [facility] 

 

    



                 MONO x10^3      0.57            0.36 - 1.02 10*3/uL     [facility] 



                                         [facility] 

 

    



                 EOS x10^3       0.39            0.06 - 0.53 10*3/uL     [facility] 



                                         [facility] 

 

    



                 BASO x10^3      0.05            0.01 - 0.09 10*3/uL     [facility] 













                                         Specimen

 





                                         Blood









   



                 Performing Organization     Address         City/State/Zipcode     Phone Number

 

   



                 [facility]     CLIA: [id number], [address] [place] [address]     [phone]





                           [facility]   





* BASIC METABOLIC PANEL (NA, K, CL, CO2, GLUCOSE, BUN, CREATININE, CA) 
  (2019  1:24 PM CDT)





    



              Component     Value        Ref Range     Performed At     Pathologist



                                         Signature

 

    



                 NA              138             135 - 145 mmol/L     [facility] 



                                         [facility] 

 

    



                 K               4.2             3.5 - 5.0 mmol/L     [facility] 

 

    



                 CL              104             98 - 108 mmol/L     [facility] 

 

    



                 CO2 TOTAL       28              23 - 31 mmol/L     [facility] 

 

    



                 AGAP            6               2 - 16          [facility] 

 

    



                 BUN             22              7 - 23 mg/dL     [facility] 

 

    



                 GLUCOSE         99              70 - 110 mg/dL     [facility] 

 

    



                 CREATININE      0.83            0.60 - 1.25 mg/dL     [facility] 

 

    



                 CALCIUM         9.0             8.6 - 10.6 mg/dL     [facility] 



                                         [facility] 

 

    



                 eGFR            90.6            mL/min/1.73m2     [facility] 



                           Calculation               [facility] 



                           ([facility] 



                                         American)    

 

    



                 eGFR            109.8           mL/min/1.73m2     [facility] 



                           Calculation               [facility] 



                           ([facility] 



                                         American)    













                                         Specimen

 





                                         Blood









 



                           Narrative                 Performed At

 

 



                                         Association of Glomerular Filtration Rate (GFR) and Staging of Kidney Disease* 

                                         [facility]



                           +-----------------------+---------------------+-------------------------+     [facility]



                           | GFR (mL/min/1.73 m2)| With Kidney Damage|Without Kidney Damage     

CAMPUS



                                         +-----------------------+---------------------+-------------------------+ 



                                         |>90|Stage 



                                         one| Normal 



                                         +-----------------------+---------------------+-------------------------+ 



                                         |60-89|Stage 



                                         two| Decreased GFR 



                                         +-----------------------+---------------------+-------------------------+ 



                                         |30-59|Stage three|

 



                                         Stage three 



                                         +-----------------------+---------------------+-------------------------+ 



                                         |15-29|Stage four |

 



                                         Stage four 



                                         +-----------------------+---------------------+-------------------------+ 



                                         |<15 (or dialysis)|Stage five | Stage 



                                         five 



                                         +-----------------------+---------------------+-------------------------+ 



                                         *Each stage assumes the associated GFR level has been in effect for at least 



                                         three months.Stages 1 to 5, with or without kidney disease, indicate chronic

 



                                         kidney disease. 



                                         Notes: Determination of stages one and two (with eGFR >59mL/min/1.73 m2) 



                                         requires estimation of kidney damage for at least three months as defined by 



                                         structural or functional abnormalities of the kidney, manifested by either: 



                                         Pathological abnormalities or Markers of kidney damage (including abnormalities

 



                                         in the composition of the blood or urine or abnormalities in imaging tests). 









   



                 Performing Organization     Address         City/State/Zipcode     Phone Number

 

   



                 [facility]     CLIA: [id number], [address] [place] [address]     [phone]





                           [facility]   





* PROTHROMBIN TIME / INR (2019  1:24 PM CDT)





    



              Component     Value        Ref Range     Performed At     Pathologist



                                         Signature

 

    



                 PROTIME PATIENT     12.2            10.1 - 12.6 Seconds     [facility] 



                                         [facility] 

 

    



                     INR                 1.1Comment: Normal INR <1.1;      [facility] 



                           Warfarin Therapeutic range 2.0      [facility] 



                           to 3.0 or 2.5 to 3.5,      [facility] 



                                         depending upon the   



                                         indications.   













                                         Specimen

 





                                         Blood









   



                 Performing Organization     Address         City[facility]     Phone Number

 

   



                 [facility]     CLIA: [id number], [address] [place] [address]     [phone]





                           [facility]   





documented in this encounter



Visit Diagnoses











                                         Diagnosis

 





                                         Chest pain, unspecified type - Primary

 





                                         S/P drug eluting coronary stent placement



documented in this encounter



Administered Medications







                Action Date     Dose            Rate            Site



                           Medication Order          MAR Action    

 

                2019  1:40 PM CDT    0.5 mg                           



                           ALPRAZolam (XANAX) tablet 0.5 mg     Given    



                                         0.5 mg, Oral, ONCE, 1 dose, 19     



                                         at 1430, Routine     

 

  

 

                2019  4:08 PM CDT    325 mg                           



                           aspirin tablet            Given    



                                         Oral, TITRATE - FOR PROCEDURE USE, 1     



                                         dose, Starting 19 at 1608,     



                                         Until 19 at 1608, Routine     

 

  

 

                2019  3:47 PM CDT    108.9 mg                         



                           bivalirudin (ANGIOMAX) IV bolus     Given    



                                         Slow IV Push, TITRATE - FOR PROCEDURE     



                                         USE, 1 dose, Starting 19 at     



                                         1547, Until 19 at 1547, Routine     

 

  

 

                2019  3:50 PM CDT    1.75 mg/kg/hr                     



                           bivalirudin (ANGIOMAX) IV bolus     New Bag    



                                         Slow IV Push, CONTINUOUS PRN, Starting     



                                         19 at 1613, Until 19     



                                         at 1550, Routine     

 

  

 

                2019  1:40 PM CDT    50 mg                            



                           diphenhydrAMINE (BENADRYL) tablet 50 mg     Given    



                                         50 mg, Oral, ONCE, 1 dose, 19     



                                         at 1430, Routine     

 

  

 

                2019  3:32 PM CDT    50 mcg                           



                           FENTanyl PF (SUBLIMAZE (PF)) injection     Given    



                                         Slow IV Push, TITRATE - FOR PROCEDURE     



                                         USE, 1 dose, Starting 19 at     



                                         1532, Until 19 at 1532, Routine     

 

  

 

                2019  3:36 PM CDT    3 mL                             



                           lidocaine 1% (PF) (XYLOCAINE) injection     Given    



                                         Infiltration, TITRATE - FOR PROCEDURE     



                                         USE, 1 dose, Starting 19 at     



                                         1536, Until 19 at 1536, Routine     

 

  

 

                2019  3:32 PM CDT    2 mg                             



                           midazolam (VERSED) injection     Given    



                                         IV Push, TITRATE - FOR PROCEDURE USE, 1     



                                         dose, Starting 19 at 1532,     



                                         Until 19 at 1532, Routine     

 

  

 

                2019  4:08 PM CDT    60 mg                            



                           prasugrel (EFFIENT) tablet     Given    



                                         Oral, TITRATE - FOR PROCEDURE USE, 1     



                                         dose, Starting 19 at 1608,     



                                         Until 19 at 1608, Routine     

 

  



documented in this encounter



Insurance







                                        Type



            Payer      Benefit     Subscriber ID     Effective     Phone      Address 



                           Plan /                    Dates   



                                         Group     

 

                                        Medicare Adv HMO/POS



                 [employer]     [employer]        [id number]       2019-P   



                           [place] [employer]                resent   



                                         CHOICE     









     



            Guarantor Name     Account     Relation to     Date of     Phone      Billing Address



                     Type                Patient             Birth  

 

     



            Luke Maguire     Personal/F     Self       1945     [phone]     [address]               (Home)              [place] [address]



documented as of this encounter

## 2019-12-06 NOTE — XMS REPORT
Summary of Care

                             Created on: 2019



FredyfatoumataLuke

External Reference #: [id number]

: 1945

Sex: Male



Demographics







                          Address                   [address] [id number]

 

                          Home Phone                [phone]

 

                          Preferred Language        English

 

                          Marital Status            

 

                          Latter-day Affiliation     Unknown

 

                          Race                      White

 

                          Ethnic Group              Non-





Author







                          Author                    [facility]

 

                          Organization              [facility]

 

                          Address                   Unknown

 

                          Phone                     Unavailable







Support







                Name            Relationship    Address         Phone

 

                Bianca Maguire    ECON            Unknown         [phone]







Care Team Providers







                    Care Team Member Name    Role                Phone

 

                    Pro Manuel     PCP                 [phone]







Reason for Visit

*  (Routine)





                          Referred By Contact       Referred To Contact



                 Status          Reason          Specialty       Diagnoses /  



                                         Procedures  

 

                                        



Luisito Wolfe MD



[address]



[address]



[place] [address]



Phone: [phone]



Fax: [phone]                       



Luisito Wolfe MD



[address]



[address]



[place] [address]



Phone: [phone]



Fax: [phone]



                     Closed              IM-CARDIOVASCULA     Diagnoses  



                           R DISEASE /               [facility]  



                           Cardiac Cath Lab          ANGIO

  



                                         P  



                                         rocedures  



                                         ND CATH PLMT L HRT  



                                         & ARTS W/NJX &  



                                         ANGIO IMG S&I  



                                         ND PRQ TRLUML  



                                         CORONARY STENT  



                                         W/ANGIO ONE  



                                         ART/BRNCH  



                                         ND PERC DRUG-EL  



                                         COR STENT SING  



                                         ND CATH,  



                                         TRANSLUMIN  



                                         NON-LASER  



                                         ND INTRODUCTION  



                                         CATHETER AORTA  



                                         ND SLCTV CATHJ  



                                         3RD+ ORD SLCTV  



                                         ABDL PEL/LXTR  



                                         BRNCH  



                                         ND SLCTV CATHJ EA  



                                         2ND+ ORD ABDL  



                                         PEL/LXTR ART BRNCH  



                                         CHG ANGIO  



                                         AORTOGRAM ABD  



                                         SERIAL  



                                         CHG ANGIO  



                                         EXTREMITY UNILAT  



                                         CHG ANGIO  



                                         EXTERMITY BILAT  



                                         CHG ANGIO EA ADDNL  



                                         SELECTV VESSEL  



                                         CHG ANGIO  



                                         AORTOBIFEMORAL W  



                                         CATH  



                                         [facility]  











Encounter Details







                          Care Team                 Description



                     Date                Type                Department  

 

                                        



Luisito Wolfe MD



[address]



[address]



[place] [address]



[phone] (Fax)





2, Clc Cardiac Proc Room                S/P drug eluting coronary stent placement (Primary Dx)



                     2019          Hospital            [facility]  



                           Encounter                 - Cath Lab, [address] [address] [phone]  







Allergies







                                        Comments



                 Active Allergy     Reactions       Severity        Noted Date 

 

                                         



                     Azithromycin        Hives               2019 



documented as of this encounter (statuses as of 2019)



Medications







                          End Date                  Status



              Medication     Sig          Dispensed     Refills      Start  



                                         Date  

 

                                                    Active



                     clopidogrel (PLAVIX) 75     Take 75 mg by       0   



                           mg tablet                 mouth daily.     

 

                                                    Active



                     aspirin 81 mg chewable     Take 81 mg by       0   



                           tablet                    mouth daily.     

 

                                                    Active



                     amlodipine          Take 10-40 mg       0   



                           besylate/benazepril       by mouth     



                           (AMLODIPINE-BENAZEPRIL     daily.     



                                         ORAL)      

 

                                                    Active



                     Omeprazole 20 mg tablet     Take  by            0   



                                         mouth daily.     

 

                                                    Active



                     HYDRAZINE SULFATE, BULK,     25 mg daily.        0   



                                         MISC      

 

                                                    Active



                     metoprolol succinate XL     Take 25 mg by       0   



                           25 mg 24 hr tablet        mouth daily.     

 

                                                    Active



                     furosemide (LASIX) 40 mg     Take 40 mg by       0   



                           tablet                    mouth daily.     

 

                          2019                Discontinued



              atorvastatin 40 mg     Take 1 tablet     90 tablet     3              



                     tabletIndications:     by mouth at         9  



                           Percutaneous Coronary     bedtime.     



                           Intervention              Indications:     



                                         Incision     



                                         through the     



                                         Skin to     



                                         Repair Blood     



                                         Vessels of     



                                         Heart     



documented as of this encounter (statuses as of 2019)



Active Problems







 



                           Problem                   Noted Date

 

 



                           S/P drug eluting coronary stent placement     2019

 

 



                                         Overview:



                                         Resolute Shafer 3.5mm x 12mm drug eluting stent to RCA by Dr. Wolfe on



                                         19



documented as of this encounter (statuses as of 2019)



Social History







                                        Date



                 Tobacco Use     Types           Packs/Day       Years Used 

 

                                         



                                         Never Assessed    









 



                           Sex Assigned at Birth     Date Recorded

 

 



                                         Not on file 









                                        Industry



                           Job Start Date            Occupation 

 

                                        Not on file



                           Not on file               Not on file 









                                        Travel End



                           Travel History            Travel Start 

 





                                         No recent travel history available.



documented as of this encounter



Last Filed Vital Signs







                    Reading             Time Taken          Comments



                                         Vital Sign   

 

                    151/70              2019  5:45 PM CDT     



                                         Blood Pressure   

 

                    69                  2019  5:45 PM CDT     



                                         Pulse   

 

                    36.4 C (97.6 F)    2019 11:16 AM CDT     



                                         Temperature   

 

                    20                  2019  5:45 PM CDT     



                                         Respiratory Rate   

 

                    95%                 2019  5:45 PM CDT     



                                         Oxygen Saturation   

 

                    -                   -                    



                                         Inhaled Oxygen   



                                         Concentration   

 

                    145 kg (319 lb 10.7 oz)    2019  1:54 PM CDT     



                                         Weight   

 

                    185 cm (6' 0.84")    2019  1:54 PM CDT     



                                         Height   

 

                    42.37               2019  1:54 PM CDT     



                                         Body Mass Index   



documented in this encounter



Discharge Instructions

* Instructions* 



 Darya Alatorre RN - 2019



Formatting of this note might be different from the original.

Patient Discharge Instructions



Follow instructions as indicated below:

Discharge Orders 

Activity As Tolerated 



Lift nothing heavier than 5 pounds for 2 weeks 



Do not operate a motorized vehicle for 2 days 



Keep area dry and clean 



Do not remove band-aid for the first 24 hours after procedure 



Do not soak in bathtub or hot tub for the first 24 hours after procedure 



Watch for bleeding, swelling, pain, fever, and any discharge call the Cath Lab (
during hours) 

Order Comments: At nights, weekends or holidays, call the [facility] hospital 
at [phone]. Ask the  to page the Cardiac Cath Fellow who is on[facility]. 



Avoid making important life decisions for the first 48 hours 



No strenuous activity for 72 hours  



Drink plenty of water 



Continue previous outpatient medications 



Hold Metformin for 48 hours 



Cardiac (2 gm Sodium, Low Fat, Low Cholesterol) Diet; Texture: Regular. 



Texture Regular.  

Diabetic: No  



Cardiac (2 g Na, Low Fat/Chol) 1800 Calorie Diabetic Consistent Carbs Diet - inc
ludes HS Snack; Texture: Regular 



Texture Regular  

Diabetic: No  



Discharge Condition - 



Discharge Condition: GOOD  



Discharge Activity 



Discharge Activity: As Tolerated  

Discharge Activity: No Heavy Lifting or Strenuous Activity  



Resume pre procedure diet 

Order Comments: Resume pre procedure diet 



No VTE Prophylaxis given- Patient low risk for VTE; Not ordered during hospitali
zation 



Weight: In general, sudden weight gains or losses should be reported to your pro
vider.  Cardiac patients should weigh daily and notify their provider for a weig
ht gain of 3 pounds per day or 5 pounds per week.



Follow-up appointments:



To schedule other appointments, call the [facility] at [phone]
[phone] or [phone].  You may also make appointments online by going to [email or web address]
[email or web address] and follow the Request Appointment quicklink.



Take Home Medications

These are medications ordered for you by your healthcare provider. Do not take a
ny other medications or supplements unless advised by your healthcare provider.



Patient's Medications 

START taking these medications 

 No medications on file 

CONTINUE taking these medications which have NOT CHANGED 

 AMLODIPINE BESYLATE/BENAZEPRIL (AMLODIPINE-BENAZEPRIL ORAL)    Take 10-40 mg by
mouth daily. 

 ASPIRIN 81 MG CHEWABLE TABLET    Take 81 mg by mouth daily. 

 CLOPIDOGREL (PLAVIX) 75 MG TABLET    Take 75 mg by mouth daily. 

 FUROSEMIDE (LASIX) 40 MG TABLET    Take 40 mg by mouth daily. 

 HYDRAZINE SULFATE, BULK, MISC    25 mg daily. 

 METOPROLOL SUCCINATE XL 25 MG 24 HR TABLET    Take 25 mg by mouth daily. 

 OMEPRAZOLE 20 MG TABLET    Take  by mouth daily. 

START taking Modified Medications as Prescribed 

 No medications on file 

STOP taking these medications 

 No medications on file 



Medications:  Your doctor may prescribe medicine to prevent blood clots.  You ma
y also have to take medicine to prevent chest pain.  Take your medicine as usual
after the procedure unless your doctor has told you to stop. Any changes in your
medicine's schedule will be explained to you.



For questions regarding follow-up instructions call the [facility]
r at [phone] or [phone]



For worsening symptoms/changing condition/problems or questions: 

 During normal business hours call the [facility] Lab at [phone].


 At nights, weekends or holidays, call the [facility] hospital  at [phone]
[phone].  

 Emergency: Go to the closest emergency room or call [phone]



Signs and Symptoms of a Problem:  Call your doctor if you have any of the follow
ing problems:

 Fever

 Swelling, pain, redness around the puncture site

 Foul smell or drainage from the site

 Odd changes in sensations, like numbness, tingling, coldness or pain in the a
rm or leg where the catheter was inserted.



If you start Bleeding: Apply pressure to the site.  If the bleeding continues, h
ave someone call your doctor and make arrangements to see him/her.  Please follo
w the doctor's directions.



Our Goal is to Always Provide You with Very Good Care!

We will be mailing you a survey, Please complete and return at your convenience.

Thank You



TOBACCO AVOIDANCE



Exposure to tobacco either from smoking or from second hand (environmental) smok
e or smokeless tobacco (snuff) is damaging to your health.  This information is 
to encourage everyone to avoid tobacco exposure.  It is recommended that you:

? If you smoke or use smokeless tobacco, we encourage you to quit.

? If you have already quit smoking, continue your good work! 

? If you do not smoke or use smokeless tobacco, do not start.

? Avoid secondhand smoke.



Additional Resources

You may want to contact these organizations for further information on smoking a
nd how to quit.

American Lung Association, http://www.lungusa.org/stop-smoking/ 

American Cancer Society, http://www.cancer.org/Healthy/StayAwayfromTobacco/index


American Heart Association, http://www.heart.org/HEARTORG/GettingHealthy/QuitSmo
carlee/Quit-Smoking_[facility]_001085_SubHomePage.jsp





documented in this encounter



Plan of Treatment







   



                 Health Maintenance     Due Date        Last Done       Comments

 

   



                           HEPATITIS C (HCV) SCREEN     1945  

 

   



                           DTaP,Tdap,and Td Vaccines     1964  



                                         (1 - Tdap)   

 

   



                           COLONOSCOPY               1995  

 

   



                           Zoster Recombinant        1995  



                                         Vaccine (SHINGRIX) (1 of   



                                         2)   

 

   



                           Medicare Wellness Visit     2010  

 

   



                           PNEUMOCOCCAL VACCINES 65+     2010  



                                         (1 of 2 - PCV13)   

 

   



                           INFLUENZA VACCINE (#1)     2019  



documented as of this encounter



Procedures







                                        Comments



                 Procedure Name     Priority        Date/Time       Associated Diagnosis 

 

                                         



                     CATH PROCEDURE LOG     Routine             2019  



                                         2:09 PM CDT  

 

                                        



Results for this procedure are in the results section.



                 CBC WITH DIFFERENTIAL     Routine         2019      S/P drug eluting coronary 



                           11:15 AM CDT              stent placement 

 

                                        



Results for this procedure are in the results section.



                 PROTHROMBIN TIME / INR     Routine         2019      S/P drug eluting coronary 



                           11:15 AM CDT              stent placement 

 

                                        



Results for this procedure are in the results section.



                 CBC WITH DIFF     Routine         2019      S/P drug eluting coronary 



                           11:15 AM CDT              stent placement 

 

                                        



Results for this procedure are in the results section.



                 BASIC METABOLIC PANEL     Routine         2019      S/P drug eluting coronary 



                     (NA, K, CL, CO2, GLUCOSE,      11:15 AM CDT        stent placement 



                                         BUN, CREATININE, CA)    



documented in this encounter



Results

* CBC WITH DIFFERENTIAL (2019 11:15 AM CDT)





    



              Component     Value        Ref Range     Performed At     Pathologist



                                         [facility]

 

    



                 WBC             8.76            4.20 - 10.70     [facility] 



                           10*3/L                  [facility] 

 

    



                 RBC             5.05            4.26 - 5.52 10*6/L     [facility] 



                                         [facility] 

 

    



                 HGB             12.5            12.2 - 16.4 g/dL     [facility] 



                                         [facility] 

 

    



                 HCT             40.5            38.4 - 49.3 %     [facility] 



                                         [facility] 

 

    



                 MCV             80.2 (L)        81.7 - 95.6 fL     [facility] 



                                         [facility] 

 

    



                 MCH             24.8 (L)        26.1 - 32.7 pg     [facility] 



                                         [facility] 

 

    



                 MCHC            30.9 (L)        31.2 - 35.0 g/dL     [facility] 



                                         [facility] 

 

    



                 RDW-SD          56.3 (H)        38.5 - 51.6 fL     [facility] 



                                         [facility] 

 

    



                 RDW-CV          19.7 (H)        12.1 - 15.4 %     [facility] 



                                         [facility] 

 

    



                 PLT             262             150 - 328 10*3/L     [facility] 



                                         [facility] 

 

    



                 MPV             8.6 (L)         9.8 - 13.0 fL     [facility] 



                                         [facility] 

 

    



                 NRBC/100 WBC     0.0             0.0 - 10.0 /100 WBCs     [facility] 



                                         [facility] 

 

    



                 NRBC x10^3      <0.01           10*3/L        [facility] 



                                         [facility] 

 

    



                 GRAN MAT (NEUT)     69.5            %               [facility] 



                           %                         [facility] 

 

    



                 IMM GRAN %      0.20            %               [facility] 



                                         [facility] 

 

    



                 LYMPH %         18.6            %               [facility] 



                                         [facility] 

 

    



                 MONO %          6.8             %               [facility] 



                                         [facility] 

 

    



                 EOS %           4.0             %               [facility] 



                                         [facility] 

 

    



                 BASO %          0.9             %               [facility] 



                                         [facility] 

 

    



                 GRAN MAT        6.08            1.99 - 6.95 10*3/uL     [facility] 



                           x10^3(ANC)                [facility] 

 

    



                 IMM GRAN x10^3     <0.03           0.00 - 0.06 10*3/uL     [facility] 



                                         [facility] 

 

    



                 LYMPH x10^3     1.63            1.09 - 3.23 10*3/uL     [facility] 



                                         [facility] 

 

    



                 MONO x10^3      0.60            0.36 - 1.02 10*3/uL     [facility] 



                                         [facility] 

 

    



                 EOS x10^3       0.35            0.06 - 0.53 10*3/uL     [facility] 



                                         [facility] 

 

    



                 BASO x10^3      0.08            0.01 - 0.09 10*3/uL     [facility] 













                                         Specimen

 





                                         Blood









   



                 Performing Organization     Address         City/State/Zipcode     Phone Number

 

   



                 [facility]     CLIA: [id number], [address] [place], [place] [address] [phone]





                           [facility]   





* BASIC METABOLIC PANEL (NA, K, CL, CO2, GLUCOSE, BUN, CREATININE, CA) 
  (2019 11:15 AM CDT)





    



              Component     Value        Ref Range     Performed At     Pathologist



                                         Signature

 

    



                 NA              140             135 - 145 mmol/L     [facility] 

 

    



                 K               4.2             3.5 - 5.0 mmol/L     [facility] 



                                         [facility] 

 

    



                 CL              103             98 - 108 mmol/L     [facility] 

 

    



                 CO2 TOTAL       27              23 - 31 mmol/L     [facility] 

 

    



                 AGAP            10              2 - 16          [facility] LABORATORY 



                                         [facility] 

 

    



                 BUN             18              7 - 23 mg/dL     [facility] 

 

    



                 GLUCOSE         109             70 - 110 mg/dL     [facility] 

 

    



                 CREATININE      0.84            0.60 - 1.25 mg/dL     [facility] 

 

    



                 CALCIUM         9.1             8.6 - 10.6 mg/dL     [facility] LABOR[facility] 



                                         [facility] 

 

    



                 eGFR            89.3            mL/min/1.73m2     [facility] LABORAT[facility] 



                           Calculation               [facility] 



                           (Non-[facility] 



                                         American)    

 

    



                 eGFR            108.3           mL/min/1.73m2     [facility] LABORAT[facility] 



                           Calculation               [facility] 



                           ([facility] 



                                         [place])    













                                         Specimen

 





                                         Blood









 



                           Narrative                 Performed At

 

 



                                         Association of Glomerular Filtration Rate (GFR) and Staging of Kidney Disease* 

                                         [facility]OR[facility]



                           +-----------------------+---------------------+-------------------------+     [facility]



                           | GFR (mL/min/1.73 m2)| With Kidney Damage|Without Kidney Damage     

[place]



                                         +-----------------------+---------------------+-------------------------+ 



                                         |>90|Stage 



                                         one| Normal 



                                         +-----------------------+---------------------+-------------------------+ 



                                         |60-89|Stage 



                                         two| Decreased GFR 



                                         +-----------------------+---------------------+-------------------------+ 



                                         |30-59|Stage three|

 



                                         Stage three 



                                         +-----------------------+---------------------+-------------------------+ 



                                         |15-29|Stage four |

 



                                         Stage four 



                                         +-----------------------+---------------------+-------------------------+ 



                                         |<15 (or dialysis)|Stage five | Stage 



                                         five 



                                         +-----------------------+---------------------+-------------------------+ 



                                         *Each stage assumes the associated GFR level has been in effect for at least 



                                         three months.Stages 1 to 5, with or without kidney disease, indicate chronic

 



                                         kidney disease. 



                                         Notes: Determination of stages one and two (with eGFR >59mL/min/1.73 m2) 



                                         requires estimation of kidney damage for at least three months as defined by 



                                         structural or functional abnormalities of the kidney, manifested by either: 



                                         Pathological abnormalities or Markers of kidney damage (including abnormalities

 



                                         in the composition of the blood or urine or abnormalities in imaging tests). 









   



                 Performing Organization     Address         City/State/Zipcode     Phone Number

 

   



                 [facility]     CLIA: [id number], [address] [place] [address]     [phone]





                           [facility]   





* PROTHROMBIN TIME / INR (2019 11:15 AM CDT)





    



              Component     Value        Ref Range     Performed At     Pathologist



                                         Signature

 

    



                 PROTIME PATIENT     12.4            10.1 - 12.6 Seconds     [facility] 



                                         [facility] 

 

    



                     INR                 1.1Comment: Normal INR <1.1;      [facility] 



                           Warfarin Therapeutic range 2.0      [facility] 



                           to 3.0 or 2.5 to 3.5,      [facility] 



                                         depending upon the   



                                         indications.   













                                         Specimen

 





                                         Blood









   



                 Performing Organization     Address         City/[facility]/Zipcode     Phone Number

 

   



                 [facility]     CLIA: [id number], [address] [place] [address]     [phone]





                           [facility]   





documented in this encounter



Visit Diagnoses











                                         Diagnosis

 





                                         S/P drug eluting coronary stent placement - Primary



documented in this encounter



Administered Medications







                Action Date     Dose            Rate            Site



                           Medication Order          MAR Action    

 

                2019 12:00 PM CDT    0.5 mg                           



                           ALPRAZolam (XANAX) tablet 0.5 mg     Given    



                                         0.5 mg, Oral, ONCE, 1 dose, 19     



                                         at 1200, Routine     

 

  

 

                2019  2:10 PM CDT    108.75 mg                        



                           bivalirudin (ANGIOMAX) IV bolus     Given    



                                         Slow IV Push, PRN, Starting 19     



                                         at 1410, Until 19 at 1410,     



                                         Routine     

 

  

 

                2019  2:10 PM CDT    1.75 mg/kg/hr                     



                           bivalirudin (ANGIOMAX) IV bolus     New Bag    



                                         Slow IV Push, CONTINUOUS PRN, Starting     



                                         19 at 1410, Until 19     



                                         at 1410, Routine     

 

  

 

                2019 12:00 PM CDT    25 mg                            



                           diphenhydrAMINE (BENADRYL) tablet 25 mg     Given    



                                         25 mg, Oral, ONCE, 1 dose, 19     



                                         at 1200, Routine     

 

  

 

                2019  2:09 PM CDT    50 mcg                           



                           FENTanyl PF (SUBLIMAZE (PF)) injection     Given    



                                         Slow IV Push, PRN, Starting 19     



                                         at 1405, Until 19 at 1409,     



                                         Routine     

 

                    50 mcg                                   



                           Given                     2019   



                                         2:05 PM CDT   

 

  

 

                2019  2:09 PM CDT    2 mg                             



                           midazolam (VERSED) injection     Given    



                                         IV Push, PRN, Starting 19 at     



                                         1405, Until 19 at 1409, Routine     

 

                    2 mg                                     



                           Given                     2019   



                                         2:05 PM CDT   

 

  

 

                2019  2:20 PM CDT    300 mcg                          



                           nitroglycerin (TRIDIL) 2 mg in 10 mL D5W     Given    



                                         for Cardiac Cath     



                                         Intravenous, PRN, Starting 19     



                                         at 1420, Until 19 at 1420,     



                                         Routine     

 

  



documented in this encounter



Insurance







                                        Type



            Payer      Benefit     Subscriber ID     Effective     Phone      Address 



                           Plan /                    Dates   



                                         Group     

 

                                        Medicare Adv HMO/POS



                 [employer]     [employer]        [id number]       2019-P   



                           [place] [employer]                resent   



                                         CHOICE     









     



            Guarantor Name     Account     Relation to     Date of     Phone      Billing Address



                     Type                Patient             Birth  

 

     



            Luke Maguire     Personal/F     Self       1945     [phone] [id number]               (Home)              [place] [address]



documented as of this encounter

## 2019-12-11 ENCOUNTER — HOSPITAL ENCOUNTER (OUTPATIENT)
Dept: HOSPITAL 88 - CT | Age: 74
End: 2019-12-11
Attending: THORACIC SURGERY (CARDIOTHORACIC VASCULAR SURGERY)
Payer: MEDICARE

## 2019-12-11 DIAGNOSIS — H05.243: Primary | ICD-10-CM

## 2019-12-11 LAB
BUN SERPL-MCNC: 24 MG/DL (ref 7–26)
BUN/CREAT SERPL: 27 (ref 6–25)
EGFRCR SERPLBLD CKD-EPI 2021: > 60 ML/MIN (ref 60–?)

## 2019-12-11 PROCEDURE — 70481 CT ORBIT/EAR/FOSSA W/DYE: CPT

## 2019-12-11 PROCEDURE — 82565 ASSAY OF CREATININE: CPT

## 2019-12-11 PROCEDURE — 36415 COLL VENOUS BLD VENIPUNCTURE: CPT

## 2019-12-11 PROCEDURE — 84520 ASSAY OF UREA NITROGEN: CPT

## 2019-12-12 NOTE — DIAGNOSTIC IMAGING REPORT
CT ORBIT/SELLA/PF W



HISTORY: Orbital mass    



COMPARISON:  None.



TECHNIQUE:  Axial CT images of the orbits were obtained with intravenous

contrast. Coronal and sagittal reformatted images were created. One or more of

the following dose reduction techniques were used: Automated exposure control,

adjustment of the mA and/or kV according to patient size, and/or utilization of

iterative reconstruction technique.





FINDINGS:  

Mild deformity of the right lamina papyracea is likely from remote/chronic

fracture. The right medial rectus muscle slightly protrude into the fracture

defect (along with orbital fat).



The orbital walls are otherwise grossly intact.

The ocular globes and extraocular muscles are otherwise normal in morphology.

The orbital optic nerves are normal in morphology.

The intraconal and extraconal spaces, including the lacrimal glands, are

otherwise unremarkable.

The cavernous sinuses are grossly normal in size and enhance symmetrically.

Carotid siphon calcifications are present.

Meckel's caves are grossly clear.



Additional findings: 

Mild polypoid mucosal thickening in the lateral recess of the left sphenoid

sinus is associated with dehiscence of the left foramen rotundum, left vidian

canal, and floor of the left middle cranial fossa.



There is also mild mucosal thickening in the bilateral maxillary sinuses and

left frontonasal recess. Bilateral Andrea cells are present.



There is mild generalized cerebral volume loss. Mild periventricular white

matter hypodensities are likely chronic microvascular ischemic changes. 



IMPRESSION:  



1.  Old right lamina papyracea fracture. The right medial rectus muscle

slightly protrude into the fracture defect (along with orbital fat).

2.  Otherwise, unremarkable orbits.

3.  Mild focal polypoid mucosal thickening in the lateral recess of the left

sphenoid sinus is associated with subtle dehiscence of the left foramen

rotundum, left vidian canal, and floor of the left middle cranial fossa.



Signed by: Dr. Orlando Jean-Baptiste M.D. on 12/12/2019 12:32 PM

## 2020-03-08 ENCOUNTER — HOSPITAL ENCOUNTER (EMERGENCY)
Dept: HOSPITAL 88 - ER | Age: 75
Discharge: HOME | End: 2020-03-08
Payer: COMMERCIAL

## 2020-03-08 VITALS — BODY MASS INDEX: 41.75 KG/M2 | HEIGHT: 73 IN | WEIGHT: 315 LBS

## 2020-03-08 DIAGNOSIS — I10: Primary | ICD-10-CM

## 2020-03-08 PROCEDURE — 99283 EMERGENCY DEPT VISIT LOW MDM: CPT

## 2023-10-27 ENCOUNTER — HOSPITAL ENCOUNTER (EMERGENCY)
Dept: HOSPITAL 88 - ER | Age: 78
Discharge: NURSING, REHAB OR LONG-TERM CARE | End: 2023-10-27
Payer: MEDICARE

## 2023-10-27 VITALS — BODY MASS INDEX: 41.75 KG/M2 | WEIGHT: 315 LBS | HEIGHT: 73 IN

## 2023-10-27 VITALS — OXYGEN SATURATION: 97 %

## 2023-10-27 DIAGNOSIS — R60.9: Primary | ICD-10-CM

## 2023-10-27 DIAGNOSIS — N18.9: ICD-10-CM

## 2023-10-27 DIAGNOSIS — D64.9: ICD-10-CM

## 2023-10-27 DIAGNOSIS — I50.9: ICD-10-CM

## 2023-10-27 DIAGNOSIS — E88.09: ICD-10-CM

## 2023-10-27 LAB
ALBUMIN SERPL-MCNC: 2.9 G/DL (ref 3.5–5)
ALBUMIN/GLOB SERPL: 0.8 {RATIO} (ref 0.8–2)
ALP SERPL-CCNC: 111 IU/L (ref 40–150)
ALT SERPL-CCNC: 18 IU/L (ref 0–55)
ANION GAP SERPL CALC-SCNC: 12.1 MMOL/L (ref 8–16)
BASOPHILS # BLD AUTO: 0.1 10*3/UL (ref 0–0.1)
BASOPHILS NFR BLD AUTO: 0.9 % (ref 0–1)
BUN SERPL-MCNC: 34 MG/DL (ref 7–26)
BUN/CREAT SERPL: 20 (ref 6–25)
CALCIUM SERPL-MCNC: 9 MG/DL (ref 8.4–10.2)
CHLORIDE SERPL-SCNC: 107 MMOL/L (ref 98–107)
CO2 SERPL-SCNC: 29 MMOL/L (ref 22–29)
DEPRECATED APTT PLAS QN: 34.6 SECONDS (ref 23.8–35.5)
DEPRECATED INR PLAS: 1.25
DEPRECATED NEUTROPHILS # BLD AUTO: 4.9 10*3/UL (ref 2.1–6.9)
EOSINOPHIL # BLD AUTO: 0.2 10*3/UL (ref 0–0.4)
EOSINOPHIL NFR BLD AUTO: 2.9 % (ref 0–6)
ERYTHROCYTE [DISTWIDTH] IN CORD BLOOD: 19.5 % (ref 11.7–14.4)
GLOBULIN PLAS-MCNC: 3.7 G/DL (ref 2.3–3.5)
GLUCOSE SERPLBLD-MCNC: 140 MG/DL (ref 74–118)
HCT VFR BLD AUTO: 30.5 % (ref 38.2–49.6)
HGB BLD-MCNC: 8.9 G/DL (ref 14–18)
LYMPHOCYTES # BLD: 0.9 10*3/UL (ref 1–3.2)
LYMPHOCYTES NFR BLD AUTO: 13.7 % (ref 18–39.1)
MAGNESIUM SERPL-MCNC: 2.1 MG/DL (ref 1.3–2.1)
MCH RBC QN AUTO: 21.9 PG (ref 28–32)
MCHC RBC AUTO-ENTMCNC: 29.2 G/DL (ref 31–35)
MCV RBC AUTO: 75.1 FL (ref 81–99)
MONOCYTES # BLD AUTO: 0.4 10*3/UL (ref 0.2–0.8)
MONOCYTES NFR BLD AUTO: 6.1 % (ref 4.4–11.3)
NEUTS SEG NFR BLD AUTO: 75.2 % (ref 38.7–80)
PLATELET # BLD AUTO: 310 X10E3/UL (ref 140–360)
POTASSIUM SERPL-SCNC: 4.1 MMOL/L (ref 3.5–5.1)
PROTHROMBIN TIME: 16.5 SECONDS (ref 11.9–14.5)
RBC # BLD AUTO: 4.06 X10E6/UL (ref 4.3–5.7)
SODIUM SERPL-SCNC: 144 MMOL/L (ref 136–145)
WBC # BLD: 6.56 X10E3/UL (ref 4.8–10.8)

## 2023-10-27 PROCEDURE — 36415 COLL VENOUS BLD VENIPUNCTURE: CPT

## 2023-10-27 PROCEDURE — 84484 ASSAY OF TROPONIN QUANT: CPT

## 2023-10-27 PROCEDURE — 85730 THROMBOPLASTIN TIME PARTIAL: CPT

## 2023-10-27 PROCEDURE — 85025 COMPLETE CBC W/AUTO DIFF WBC: CPT

## 2023-10-27 PROCEDURE — 71045 X-RAY EXAM CHEST 1 VIEW: CPT

## 2023-10-27 PROCEDURE — 85610 PROTHROMBIN TIME: CPT

## 2023-10-27 PROCEDURE — 83735 ASSAY OF MAGNESIUM: CPT

## 2023-10-27 PROCEDURE — 99284 EMERGENCY DEPT VISIT MOD MDM: CPT

## 2023-10-27 PROCEDURE — 80053 COMPREHEN METABOLIC PANEL: CPT
